# Patient Record
Sex: FEMALE
[De-identification: names, ages, dates, MRNs, and addresses within clinical notes are randomized per-mention and may not be internally consistent; named-entity substitution may affect disease eponyms.]

---

## 2019-03-11 ENCOUNTER — TRANSCRIPTION ENCOUNTER (OUTPATIENT)
Age: 39
End: 2019-03-11

## 2019-03-11 ENCOUNTER — INPATIENT (INPATIENT)
Facility: HOSPITAL | Age: 39
LOS: 2 days | Discharge: HOME | End: 2019-03-14
Attending: UROLOGY | Admitting: UROLOGY
Payer: MEDICAID

## 2019-03-11 VITALS
OXYGEN SATURATION: 100 % | DIASTOLIC BLOOD PRESSURE: 56 MMHG | SYSTOLIC BLOOD PRESSURE: 94 MMHG | TEMPERATURE: 99 F | RESPIRATION RATE: 18 BRPM | HEART RATE: 96 BPM

## 2019-03-11 DIAGNOSIS — Z90.49 ACQUIRED ABSENCE OF OTHER SPECIFIED PARTS OF DIGESTIVE TRACT: Chronic | ICD-10-CM

## 2019-03-11 DIAGNOSIS — N20.0 CALCULUS OF KIDNEY: ICD-10-CM

## 2019-03-11 LAB
ALBUMIN SERPL ELPH-MCNC: 3.8 G/DL — SIGNIFICANT CHANGE UP (ref 3.3–5)
ALP SERPL-CCNC: 58 U/L — SIGNIFICANT CHANGE UP (ref 40–120)
ALT FLD-CCNC: 11 U/L — SIGNIFICANT CHANGE UP (ref 4–33)
ANION GAP SERPL CALC-SCNC: 13 MMO/L — SIGNIFICANT CHANGE UP (ref 7–14)
APPEARANCE UR: CLEAR — SIGNIFICANT CHANGE UP
AST SERPL-CCNC: 26 U/L — SIGNIFICANT CHANGE UP (ref 4–32)
BACTERIA # UR AUTO: NEGATIVE — SIGNIFICANT CHANGE UP
BASE EXCESS BLDV CALC-SCNC: -2.2 MMOL/L — SIGNIFICANT CHANGE UP
BASE EXCESS BLDV CALC-SCNC: 0.6 MMOL/L — SIGNIFICANT CHANGE UP
BASOPHILS # BLD AUTO: 0.02 K/UL — SIGNIFICANT CHANGE UP (ref 0–0.2)
BASOPHILS NFR BLD AUTO: 0.2 % — SIGNIFICANT CHANGE UP (ref 0–2)
BILIRUB SERPL-MCNC: 0.3 MG/DL — SIGNIFICANT CHANGE UP (ref 0.2–1.2)
BILIRUB UR-MCNC: NEGATIVE — SIGNIFICANT CHANGE UP
BLOOD GAS VENOUS - CREATININE: 0.96 MG/DL — SIGNIFICANT CHANGE UP (ref 0.5–1.3)
BLOOD GAS VENOUS - CREATININE: 1.1 MG/DL — SIGNIFICANT CHANGE UP (ref 0.5–1.3)
BLOOD UR QL VISUAL: SIGNIFICANT CHANGE UP
BUN SERPL-MCNC: 26 MG/DL — HIGH (ref 7–23)
CALCIUM SERPL-MCNC: 8.7 MG/DL — SIGNIFICANT CHANGE UP (ref 8.4–10.5)
CHLORIDE BLDV-SCNC: 109 MMOL/L — HIGH (ref 96–108)
CHLORIDE BLDV-SCNC: 112 MMOL/L — HIGH (ref 96–108)
CHLORIDE SERPL-SCNC: 103 MMOL/L — SIGNIFICANT CHANGE UP (ref 98–107)
CO2 SERPL-SCNC: 21 MMOL/L — LOW (ref 22–31)
COLOR SPEC: YELLOW — SIGNIFICANT CHANGE UP
CREAT SERPL-MCNC: 1.13 MG/DL — SIGNIFICANT CHANGE UP (ref 0.5–1.3)
EOSINOPHIL # BLD AUTO: 0.01 K/UL — SIGNIFICANT CHANGE UP (ref 0–0.5)
EOSINOPHIL NFR BLD AUTO: 0.1 % — SIGNIFICANT CHANGE UP (ref 0–6)
GAS PNL BLDV: 133 MMOL/L — LOW (ref 136–146)
GAS PNL BLDV: 134 MMOL/L — LOW (ref 136–146)
GLUCOSE BLDV-MCNC: 100 — HIGH (ref 70–99)
GLUCOSE BLDV-MCNC: 110 — HIGH (ref 70–99)
GLUCOSE SERPL-MCNC: 111 MG/DL — HIGH (ref 70–99)
GLUCOSE UR-MCNC: NEGATIVE — SIGNIFICANT CHANGE UP
HCO3 BLDV-SCNC: 22 MMOL/L — SIGNIFICANT CHANGE UP (ref 20–27)
HCO3 BLDV-SCNC: 24 MMOL/L — SIGNIFICANT CHANGE UP (ref 20–27)
HCT VFR BLD CALC: 36.9 % — SIGNIFICANT CHANGE UP (ref 34.5–45)
HCT VFR BLDV CALC: 32.4 % — LOW (ref 34.5–45)
HCT VFR BLDV CALC: 37.9 % — SIGNIFICANT CHANGE UP (ref 34.5–45)
HGB BLD-MCNC: 11.6 G/DL — SIGNIFICANT CHANGE UP (ref 11.5–15.5)
HGB BLDV-MCNC: 10.5 G/DL — LOW (ref 11.5–15.5)
HGB BLDV-MCNC: 12.3 G/DL — SIGNIFICANT CHANGE UP (ref 11.5–15.5)
HYALINE CASTS # UR AUTO: SIGNIFICANT CHANGE UP
IMM GRANULOCYTES NFR BLD AUTO: 0.2 % — SIGNIFICANT CHANGE UP (ref 0–1.5)
KETONES UR-MCNC: SIGNIFICANT CHANGE UP
LACTATE BLDV-MCNC: 1.5 MMOL/L — SIGNIFICANT CHANGE UP (ref 0.5–2)
LACTATE BLDV-MCNC: 2.5 MMOL/L — HIGH (ref 0.5–2)
LEUKOCYTE ESTERASE UR-ACNC: SIGNIFICANT CHANGE UP
LIDOCAIN IGE QN: 28.1 U/L — SIGNIFICANT CHANGE UP (ref 7–60)
LYMPHOCYTES # BLD AUTO: 0.6 K/UL — LOW (ref 1–3.3)
LYMPHOCYTES # BLD AUTO: 5.6 % — LOW (ref 13–44)
MCHC RBC-ENTMCNC: 28.1 PG — SIGNIFICANT CHANGE UP (ref 27–34)
MCHC RBC-ENTMCNC: 31.4 % — LOW (ref 32–36)
MCV RBC AUTO: 89.3 FL — SIGNIFICANT CHANGE UP (ref 80–100)
MONOCYTES # BLD AUTO: 0.09 K/UL — SIGNIFICANT CHANGE UP (ref 0–0.9)
MONOCYTES NFR BLD AUTO: 0.8 % — LOW (ref 2–14)
NEUTROPHILS # BLD AUTO: 9.91 K/UL — HIGH (ref 1.8–7.4)
NEUTROPHILS NFR BLD AUTO: 93.1 % — HIGH (ref 43–77)
NITRITE UR-MCNC: NEGATIVE — SIGNIFICANT CHANGE UP
NRBC # FLD: 0 K/UL — LOW (ref 25–125)
PCO2 BLDV: 41 MMHG — SIGNIFICANT CHANGE UP (ref 41–51)
PCO2 BLDV: 46 MMHG — SIGNIFICANT CHANGE UP (ref 41–51)
PH BLDV: 7.36 PH — SIGNIFICANT CHANGE UP (ref 7.32–7.43)
PH BLDV: 7.36 PH — SIGNIFICANT CHANGE UP (ref 7.32–7.43)
PH UR: 6.5 — SIGNIFICANT CHANGE UP (ref 5–8)
PLATELET # BLD AUTO: 201 K/UL — SIGNIFICANT CHANGE UP (ref 150–400)
PMV BLD: 9.8 FL — SIGNIFICANT CHANGE UP (ref 7–13)
PO2 BLDV: 30 MMHG — LOW (ref 35–40)
PO2 BLDV: 41 MMHG — HIGH (ref 35–40)
POTASSIUM BLDV-SCNC: 3.1 MMOL/L — LOW (ref 3.4–4.5)
POTASSIUM BLDV-SCNC: 5.3 MMOL/L — HIGH (ref 3.4–4.5)
POTASSIUM SERPL-MCNC: 4 MMOL/L — SIGNIFICANT CHANGE UP (ref 3.5–5.3)
POTASSIUM SERPL-SCNC: 4 MMOL/L — SIGNIFICANT CHANGE UP (ref 3.5–5.3)
PROT SERPL-MCNC: 6.8 G/DL — SIGNIFICANT CHANGE UP (ref 6–8.3)
PROT UR-MCNC: 50 — SIGNIFICANT CHANGE UP
RBC # BLD: 4.13 M/UL — SIGNIFICANT CHANGE UP (ref 3.8–5.2)
RBC # FLD: 14.2 % — SIGNIFICANT CHANGE UP (ref 10.3–14.5)
RBC CASTS # UR COMP ASSIST: SIGNIFICANT CHANGE UP (ref 0–?)
SAO2 % BLDV: 50.8 % — LOW (ref 60–85)
SAO2 % BLDV: 71.2 % — SIGNIFICANT CHANGE UP (ref 60–85)
SODIUM SERPL-SCNC: 137 MMOL/L — SIGNIFICANT CHANGE UP (ref 135–145)
SP GR SPEC: 1.04 — SIGNIFICANT CHANGE UP (ref 1–1.04)
SQUAMOUS # UR AUTO: SIGNIFICANT CHANGE UP
UROBILINOGEN FLD QL: NORMAL — SIGNIFICANT CHANGE UP
WBC # BLD: 10.65 K/UL — HIGH (ref 3.8–10.5)
WBC # FLD AUTO: 10.65 K/UL — HIGH (ref 3.8–10.5)
WBC UR QL: >50 — HIGH (ref 0–?)

## 2019-03-11 PROCEDURE — 74176 CT ABD & PELVIS W/O CONTRAST: CPT | Mod: 26

## 2019-03-11 RX ORDER — INFLUENZA VIRUS VACCINE 15; 15; 15; 15 UG/.5ML; UG/.5ML; UG/.5ML; UG/.5ML
0.5 SUSPENSION INTRAMUSCULAR ONCE
Qty: 0 | Refills: 0 | Status: COMPLETED | OUTPATIENT
Start: 2019-03-11 | End: 2019-03-11

## 2019-03-11 RX ORDER — HEPARIN SODIUM 5000 [USP'U]/ML
5000 INJECTION INTRAVENOUS; SUBCUTANEOUS EVERY 8 HOURS
Qty: 0 | Refills: 0 | Status: DISCONTINUED | OUTPATIENT
Start: 2019-03-11 | End: 2019-03-14

## 2019-03-11 RX ORDER — MORPHINE SULFATE 50 MG/1
4 CAPSULE, EXTENDED RELEASE ORAL ONCE
Qty: 0 | Refills: 0 | Status: DISCONTINUED | OUTPATIENT
Start: 2019-03-11 | End: 2019-03-11

## 2019-03-11 RX ORDER — SODIUM CHLORIDE 9 MG/ML
1000 INJECTION, SOLUTION INTRAVENOUS
Qty: 0 | Refills: 0 | Status: DISCONTINUED | OUTPATIENT
Start: 2019-03-11 | End: 2019-03-13

## 2019-03-11 RX ORDER — ONDANSETRON 8 MG/1
4 TABLET, FILM COATED ORAL ONCE
Qty: 0 | Refills: 0 | Status: COMPLETED | OUTPATIENT
Start: 2019-03-11 | End: 2019-03-11

## 2019-03-11 RX ORDER — KETOROLAC TROMETHAMINE 30 MG/ML
30 SYRINGE (ML) INJECTION ONCE
Qty: 0 | Refills: 0 | Status: DISCONTINUED | OUTPATIENT
Start: 2019-03-11 | End: 2019-03-11

## 2019-03-11 RX ORDER — SODIUM CHLORIDE 9 MG/ML
3 INJECTION INTRAMUSCULAR; INTRAVENOUS; SUBCUTANEOUS ONCE
Qty: 0 | Refills: 0 | Status: COMPLETED | OUTPATIENT
Start: 2019-03-11 | End: 2019-03-11

## 2019-03-11 RX ORDER — CEFTRIAXONE 500 MG/1
1 INJECTION, POWDER, FOR SOLUTION INTRAMUSCULAR; INTRAVENOUS ONCE
Qty: 0 | Refills: 0 | Status: COMPLETED | OUTPATIENT
Start: 2019-03-11 | End: 2019-03-11

## 2019-03-11 RX ORDER — KETOROLAC TROMETHAMINE 30 MG/ML
15 SYRINGE (ML) INJECTION EVERY 6 HOURS
Qty: 0 | Refills: 0 | Status: DISCONTINUED | OUTPATIENT
Start: 2019-03-11 | End: 2019-03-12

## 2019-03-11 RX ORDER — SODIUM CHLORIDE 9 MG/ML
2000 INJECTION INTRAMUSCULAR; INTRAVENOUS; SUBCUTANEOUS ONCE
Qty: 0 | Refills: 0 | Status: COMPLETED | OUTPATIENT
Start: 2019-03-11 | End: 2019-03-11

## 2019-03-11 RX ORDER — MORPHINE SULFATE 50 MG/1
2 CAPSULE, EXTENDED RELEASE ORAL ONCE
Qty: 0 | Refills: 0 | Status: DISCONTINUED | OUTPATIENT
Start: 2019-03-11 | End: 2019-03-11

## 2019-03-11 RX ORDER — ACETAMINOPHEN 500 MG
1000 TABLET ORAL ONCE
Qty: 0 | Refills: 0 | Status: COMPLETED | OUTPATIENT
Start: 2019-03-11 | End: 2019-03-11

## 2019-03-11 RX ORDER — TAMSULOSIN HYDROCHLORIDE 0.4 MG/1
0.4 CAPSULE ORAL AT BEDTIME
Qty: 0 | Refills: 0 | Status: DISCONTINUED | OUTPATIENT
Start: 2019-03-11 | End: 2019-03-14

## 2019-03-11 RX ORDER — ONDANSETRON 8 MG/1
4 TABLET, FILM COATED ORAL EVERY 6 HOURS
Qty: 0 | Refills: 0 | Status: DISCONTINUED | OUTPATIENT
Start: 2019-03-11 | End: 2019-03-14

## 2019-03-11 RX ADMIN — Medication 15 MILLIGRAM(S): at 19:22

## 2019-03-11 RX ADMIN — CEFTRIAXONE 100 GRAM(S): 500 INJECTION, POWDER, FOR SOLUTION INTRAMUSCULAR; INTRAVENOUS at 07:29

## 2019-03-11 RX ADMIN — MORPHINE SULFATE 4 MILLIGRAM(S): 50 CAPSULE, EXTENDED RELEASE ORAL at 07:29

## 2019-03-11 RX ADMIN — MORPHINE SULFATE 2 MILLIGRAM(S): 50 CAPSULE, EXTENDED RELEASE ORAL at 13:10

## 2019-03-11 RX ADMIN — HEPARIN SODIUM 5000 UNIT(S): 5000 INJECTION INTRAVENOUS; SUBCUTANEOUS at 22:19

## 2019-03-11 RX ADMIN — SODIUM CHLORIDE 125 MILLILITER(S): 9 INJECTION, SOLUTION INTRAVENOUS at 12:31

## 2019-03-11 RX ADMIN — MORPHINE SULFATE 4 MILLIGRAM(S): 50 CAPSULE, EXTENDED RELEASE ORAL at 05:40

## 2019-03-11 RX ADMIN — Medication 15 MILLIGRAM(S): at 19:07

## 2019-03-11 RX ADMIN — SODIUM CHLORIDE 2000 MILLILITER(S): 9 INJECTION INTRAMUSCULAR; INTRAVENOUS; SUBCUTANEOUS at 05:20

## 2019-03-11 RX ADMIN — MORPHINE SULFATE 2 MILLIGRAM(S): 50 CAPSULE, EXTENDED RELEASE ORAL at 12:30

## 2019-03-11 RX ADMIN — MORPHINE SULFATE 4 MILLIGRAM(S): 50 CAPSULE, EXTENDED RELEASE ORAL at 05:19

## 2019-03-11 RX ADMIN — TAMSULOSIN HYDROCHLORIDE 0.4 MILLIGRAM(S): 0.4 CAPSULE ORAL at 22:19

## 2019-03-11 RX ADMIN — Medication 400 MILLIGRAM(S): at 13:29

## 2019-03-11 RX ADMIN — Medication 30 MILLIGRAM(S): at 05:40

## 2019-03-11 RX ADMIN — ONDANSETRON 4 MILLIGRAM(S): 8 TABLET, FILM COATED ORAL at 05:19

## 2019-03-11 RX ADMIN — SODIUM CHLORIDE 3 MILLILITER(S): 9 INJECTION INTRAMUSCULAR; INTRAVENOUS; SUBCUTANEOUS at 05:20

## 2019-03-11 RX ADMIN — Medication 30 MILLIGRAM(S): at 06:05

## 2019-03-11 NOTE — H&P ADULT - NSHPLABSRESULTS_GEN_ALL_CORE
11.6   10.65 )-----------( 201      ( 11 Mar 2019 05:10 )             36.9   11 Mar 2019 05:10    137    |  103    |  26     ----------------------------<  111    4.0     |  21     |  1.13     Ca    8.7        11 Mar 2019 05:10    Urinalysis Basic - ( 11 Mar 2019 05:10 )    Color: YELLOW / Appearance: CLEAR / S.039 / pH: 6.5  Gluc: NEGATIVE / Ketone: TRACE  / Bili: NEGATIVE / Urobili: NORMAL   Blood: SMALL / Protein: 50 / Nitrite: NEGATIVE   Leuk Esterase: LARGE / RBC: 3-5 / WBC >50   Sq Epi: OCC / Non Sq Epi: x / Bacteria: NEGATIVE      Culture: Pending      EXAM:  CT ABDOMEN AND PELVIS        PROCEDURE DATE:  Mar 11 2019         INTERPRETATION:  CLINICAL INFORMATION: Right flank/abdominal pain.    COMPARISON: None.    PROCEDURE:   CT of the Abdomen and Pelvis was performed without intravenous contrast  in the prone position.  Intravenous contrast: None.  Oral contrast: None.  Sagittal and coronal reformats were performed.    FINDINGS:    LOWER CHEST: Bilateral breast implants.    LIVER: Within normal limits.  BILE DUCTS: Normal caliber.   GALLBLADDER: Mild pericholecystic fluid, nonspecific.  SPLEEN: Within normal limits.  PANCREAS: Within normal limits.  ADRENALS: Within normal limits.  KIDNEYS/URETERS: Obstructing calculus in the right ureteropelvic   junction, measuring 1.1 x 0.8 cm with moderate right   hydroureteronephrosis and perinephric stranding.     BLADDER: Within normal limits.  REPRODUCTIVE ORGANS: The uterus and adnexa are within normal limits.    BOWEL: No bowel obstruction. Normal appendix.    PERITONEUM: No ascites.  VESSELS:  Within normal limits.  RETROPERITONEUM: No lymphadenopathy.    ABDOMINAL WALL: Within normal limits.  BONES: Within normal limits.    IMPRESSION: Obstructing calculus in the right ureteropelvic junction,   measuring 1.1 x 0.8 cm with moderate right hydroureteronephrosis and   perinephric stranding.                   JR YU M.D., ATTENDING RADIOLOGIST  This document has been electronically signed. Mar 11 2019 10:19AM                  < end of copied text >

## 2019-03-11 NOTE — H&P ADULT - HISTORY OF PRESENT ILLNESS
This is a 39 y/o F presenting with N/V, malaise, abdominal pain since 3 AM today.  No fevers, + chills.  No dysuria, hematuria or pyuria.    She has no family history of nephrolithiasis and takes no medications.  She has never had these symptoms before.

## 2019-03-11 NOTE — ED ADULT NURSE REASSESSMENT NOTE - NS ED NURSE REASSESS COMMENT FT1
pt still c/o right flank pain 6/10 with activity, 5/10 at rest. pt was given morhpine. will administer IV Tylenol

## 2019-03-11 NOTE — ED PROVIDER NOTE - ATTENDING CONTRIBUTION TO CARE
38 year old female with a history of renal stone came ot the ED because of right flank pain with vomiting that began 2 hours after eating [employer], the pain radiates to the groin. She is currently menstruating. No fever, no chills, no urinary complaints, no chest pain, no sob.

## 2019-03-11 NOTE — ED PROVIDER NOTE - NS ED ROS FT
ROS: denies HA, weakness, dizziness, fevers/chills, chest pain, SOB, diaphoresis, diarrhea, joint pain, neuro deficits, dysuria/hematuria, rash    +n/v, syncope, R abd pain

## 2019-03-11 NOTE — ED ADULT NURSE REASSESSMENT NOTE - NS ED NURSE REASSESS COMMENT FT1
received pt from MARIA ALEJANDRA Gannon. Pt a&ox3. pt c/o right flank pain that radiates toward right abdomen. pt reports improvement in pain (3/10). pt denies difficulty urinating and that her bowel movements have been normal. pt currently on menstrual cycle. pt reports having extremely spicy food yesterday , the pain started after this. abdomen soft and tender to touch on the right side. BS present throughout.

## 2019-03-11 NOTE — H&P ADULT - NSHPPHYSICALEXAM_GEN_ALL_CORE
ICU Vital Signs Last 24 Hrs  T(C): 37.2 (11 Mar 2019 11:31), Max: 37.2 (11 Mar 2019 04:38)  T(F): 99 (11 Mar 2019 11:31), Max: 99 (11 Mar 2019 04:38)  HR: 94 (11 Mar 2019 11:31) (76 - 96)  BP: 94/57 (11 Mar 2019 11:31) (91/53 - 106/68)  BP(mean): 61 (11 Mar 2019 07:10) (61 - 61)  ABP: --  ABP(mean): --  RR: 16 (11 Mar 2019 11:31) (16 - 20)  SpO2: 98% (11 Mar 2019 11:31) (98% - 100%)  Voids - not recorded    Gen: mild distress, appearas diaphoretic  CV: RRR  Resp: Normal work of breathing  Abd: Soft, + percussive tenderness to palpation of right flank  : bladder non-palpable

## 2019-03-11 NOTE — ED ADULT NURSE NOTE - NSIMPLEMENTINTERV_GEN_ALL_ED
Implemented All Universal Safety Interventions:  [place] to call system. Call bell, personal items and telephone within reach. Instruct patient to call for assistance. Room bathroom lighting operational. Non-slip footwear when patient is off stretcher. Physically safe environment: no spills, clutter or unnecessary equipment. Stretcher in lowest position, wheels locked, appropriate side rails in place.

## 2019-03-11 NOTE — ED ADULT NURSE NOTE - CHIEF COMPLAINT QUOTE
Pt Georgian speaking.  Family preferred to translate.  Recent travel from [place].  Pt p/w R flank pain, abd pain, N/V, since ~1030.  denies urinary symptoms.  currently on menstrual cycle.  pmhx kidney stones.  took 4 Advil 200mg prior to arrival.  VS as noted

## 2019-03-11 NOTE — ED PROVIDER NOTE - OBJECTIVE STATEMENT
37yo F with hx of appendectomy presents with R sided abd pain started 1030pm associated with 5 episodes of NBNB vomiting. No diarrhea. Sexually active. VB today. no burning with urination. Had multiple kidney stones in the past, none in the past year. No surg required for kidney stones. presyncopal/syncope due to pain

## 2019-03-11 NOTE — ED ADULT TRIAGE NOTE - CHIEF COMPLAINT QUOTE
Pt Upper sorbian speaking.  Family preferred to translate.  Recent travel from [place].  Pt p/w R flank pain, abd pain, N/V, since ~1030.  denies urinary symptoms.  currently on menstrual cycle.  pmhx kidney stones.  took 4 Advil 200mg prior to arrival.  VS as noted

## 2019-03-11 NOTE — H&P ADULT - NSHPREVIEWOFSYSTEMS_GEN_ALL_CORE
Gen: + malaise  CV: no palpitations or chest pain  Resp: no shortness of breath  GI: No N/V, No difficulty passing BMs  : no difficulty passing urine, normal urinary stream  MSK: no edema or joint pain/swelling  integumentary: no rash

## 2019-03-11 NOTE — ED PROVIDER NOTE - PHYSICAL EXAMINATION
Gen: In moderate distress, vomiting  Head: NCAT  HEENT: PERRL, MMM, normal conjunctiva, anicteric, neck supple  Lung: CTAB, no adventitious sounds  CV: RRR, no murmurs, rubs or gallops  Abd: soft, RLQ TTP, no CVAT  MSK: No edema, no visible deformities  Neuro: No focal neurologic deficits. CN II-XII grossly intact. 5/5 strength and normal sensation in all extremities.  Skin: Warm and dry, no evidence of rash  Psych: normal mood and affect

## 2019-03-11 NOTE — ED PROVIDER NOTE - CLINICAL SUMMARY MEDICAL DECISION MAKING FREE TEXT BOX
Pt with flank pain, history of appendectomy, vomiting and flank pain, having her period. WIll check labs, pain control and ct scan.

## 2019-03-11 NOTE — ED ADULT NURSE NOTE - OBJECTIVE STATEMENT
pt on bed aox3 [place] speaking, sister at bedside, reports Right Flank pain radiates to RLQ abdomen since 3pm with nausea Vomiting , Also reports eating chicken form restaurant, dysuria Headache dizziness Lightheadedness, almost passed out in the bathroom,due to pain,  denies C palpitation PMhx Kidney stone 3 yrs ago MD atbedside eval the pt. will monitor

## 2019-03-11 NOTE — ED ADULT NURSE REASSESSMENT NOTE - NS ED NURSE REASSESS COMMENT FT1
have called floor 3 times to give report . I was given to the wrong nurse twice and the 3 rd time the nurse was not available. faxing report now

## 2019-03-12 DIAGNOSIS — Z09 ENCOUNTER FOR FOLLOW-UP EXAMINATION AFTER COMPLETED TREATMENT FOR CONDITIONS OTHER THAN MALIGNANT NEOPLASM: ICD-10-CM

## 2019-03-12 LAB
ANION GAP SERPL CALC-SCNC: 13 MMO/L — SIGNIFICANT CHANGE UP (ref 7–14)
BUN SERPL-MCNC: 18 MG/DL — SIGNIFICANT CHANGE UP (ref 7–23)
CALCIUM SERPL-MCNC: 7.5 MG/DL — LOW (ref 8.4–10.5)
CHLORIDE SERPL-SCNC: 103 MMOL/L — SIGNIFICANT CHANGE UP (ref 98–107)
CO2 SERPL-SCNC: 19 MMOL/L — LOW (ref 22–31)
CREAT SERPL-MCNC: 1.16 MG/DL — SIGNIFICANT CHANGE UP (ref 0.5–1.3)
GLUCOSE SERPL-MCNC: 98 MG/DL — SIGNIFICANT CHANGE UP (ref 70–99)
HCT VFR BLD CALC: 29.8 % — LOW (ref 34.5–45)
HGB BLD-MCNC: 9.6 G/DL — LOW (ref 11.5–15.5)
MCHC RBC-ENTMCNC: 29 PG — SIGNIFICANT CHANGE UP (ref 27–34)
MCHC RBC-ENTMCNC: 32.2 % — SIGNIFICANT CHANGE UP (ref 32–36)
MCV RBC AUTO: 90 FL — SIGNIFICANT CHANGE UP (ref 80–100)
NRBC # FLD: 0 K/UL — LOW (ref 25–125)
PLATELET # BLD AUTO: 124 K/UL — LOW (ref 150–400)
PMV BLD: 10.4 FL — SIGNIFICANT CHANGE UP (ref 7–13)
POTASSIUM SERPL-MCNC: 3.6 MMOL/L — SIGNIFICANT CHANGE UP (ref 3.5–5.3)
POTASSIUM SERPL-SCNC: 3.6 MMOL/L — SIGNIFICANT CHANGE UP (ref 3.5–5.3)
RBC # BLD: 3.31 M/UL — LOW (ref 3.8–5.2)
RBC # FLD: 15.1 % — HIGH (ref 10.3–14.5)
SODIUM SERPL-SCNC: 135 MMOL/L — SIGNIFICANT CHANGE UP (ref 135–145)
WBC # BLD: 23.32 K/UL — HIGH (ref 3.8–10.5)
WBC # FLD AUTO: 23.32 K/UL — HIGH (ref 3.8–10.5)

## 2019-03-12 PROCEDURE — 99231 SBSQ HOSP IP/OBS SF/LOW 25: CPT | Mod: 24

## 2019-03-12 PROCEDURE — 52332 CYSTOSCOPY AND TREATMENT: CPT | Mod: RT

## 2019-03-12 RX ORDER — BENZOCAINE AND MENTHOL 5; 1 G/100ML; G/100ML
1 LIQUID ORAL
Qty: 0 | Refills: 0 | Status: DISCONTINUED | OUTPATIENT
Start: 2019-03-12 | End: 2019-03-14

## 2019-03-12 RX ORDER — SODIUM CHLORIDE 9 MG/ML
1000 INJECTION, SOLUTION INTRAVENOUS ONCE
Qty: 0 | Refills: 0 | Status: COMPLETED | OUTPATIENT
Start: 2019-03-12 | End: 2019-03-12

## 2019-03-12 RX ORDER — ACETAMINOPHEN 500 MG
1000 TABLET ORAL ONCE
Qty: 0 | Refills: 0 | Status: COMPLETED | OUTPATIENT
Start: 2019-03-12 | End: 2019-03-12

## 2019-03-12 RX ORDER — FENTANYL CITRATE 50 UG/ML
50 INJECTION INTRAVENOUS ONCE
Qty: 0 | Refills: 0 | Status: DISCONTINUED | OUTPATIENT
Start: 2019-03-12 | End: 2019-03-12

## 2019-03-12 RX ORDER — ALBUMIN HUMAN 25 %
250 VIAL (ML) INTRAVENOUS ONCE
Qty: 0 | Refills: 0 | Status: COMPLETED | OUTPATIENT
Start: 2019-03-12 | End: 2019-03-12

## 2019-03-12 RX ORDER — TRAMADOL HYDROCHLORIDE 50 MG/1
25 TABLET ORAL EVERY 4 HOURS
Qty: 0 | Refills: 0 | Status: DISCONTINUED | OUTPATIENT
Start: 2019-03-12 | End: 2019-03-14

## 2019-03-12 RX ORDER — SODIUM CHLORIDE 9 MG/ML
1000 INJECTION INTRAMUSCULAR; INTRAVENOUS; SUBCUTANEOUS ONCE
Qty: 0 | Refills: 0 | Status: COMPLETED | OUTPATIENT
Start: 2019-03-12 | End: 2019-03-12

## 2019-03-12 RX ORDER — CEFTRIAXONE 500 MG/1
1 INJECTION, POWDER, FOR SOLUTION INTRAMUSCULAR; INTRAVENOUS EVERY 24 HOURS
Qty: 0 | Refills: 0 | Status: DISCONTINUED | OUTPATIENT
Start: 2019-03-12 | End: 2019-03-12

## 2019-03-12 RX ORDER — SODIUM CHLORIDE 9 MG/ML
1000 INJECTION, SOLUTION INTRAVENOUS
Qty: 0 | Refills: 0 | Status: COMPLETED | OUTPATIENT
Start: 2019-03-12 | End: 2019-03-12

## 2019-03-12 RX ORDER — PIPERACILLIN AND TAZOBACTAM 4; .5 G/20ML; G/20ML
3.38 INJECTION, POWDER, LYOPHILIZED, FOR SOLUTION INTRAVENOUS EVERY 8 HOURS
Qty: 0 | Refills: 0 | Status: DISCONTINUED | OUTPATIENT
Start: 2019-03-12 | End: 2019-03-14

## 2019-03-12 RX ORDER — ONDANSETRON 8 MG/1
4 TABLET, FILM COATED ORAL ONCE
Qty: 0 | Refills: 0 | Status: DISCONTINUED | OUTPATIENT
Start: 2019-03-12 | End: 2019-03-12

## 2019-03-12 RX ORDER — TRAMADOL HYDROCHLORIDE 50 MG/1
50 TABLET ORAL EVERY 6 HOURS
Qty: 0 | Refills: 0 | Status: DISCONTINUED | OUTPATIENT
Start: 2019-03-12 | End: 2019-03-14

## 2019-03-12 RX ORDER — FENTANYL CITRATE 50 UG/ML
25 INJECTION INTRAVENOUS
Qty: 0 | Refills: 0 | Status: DISCONTINUED | OUTPATIENT
Start: 2019-03-12 | End: 2019-03-12

## 2019-03-12 RX ORDER — DOCUSATE SODIUM 100 MG
100 CAPSULE ORAL
Qty: 0 | Refills: 0 | Status: DISCONTINUED | OUTPATIENT
Start: 2019-03-12 | End: 2019-03-13

## 2019-03-12 RX ADMIN — TAMSULOSIN HYDROCHLORIDE 0.4 MILLIGRAM(S): 0.4 CAPSULE ORAL at 22:22

## 2019-03-12 RX ADMIN — PIPERACILLIN AND TAZOBACTAM 25 GRAM(S): 4; .5 INJECTION, POWDER, LYOPHILIZED, FOR SOLUTION INTRAVENOUS at 13:15

## 2019-03-12 RX ADMIN — HEPARIN SODIUM 5000 UNIT(S): 5000 INJECTION INTRAVENOUS; SUBCUTANEOUS at 13:16

## 2019-03-12 RX ADMIN — Medication 15 MILLIGRAM(S): at 06:55

## 2019-03-12 RX ADMIN — Medication 500 MILLILITER(S): at 08:58

## 2019-03-12 RX ADMIN — Medication 15 MILLIGRAM(S): at 01:03

## 2019-03-12 RX ADMIN — SODIUM CHLORIDE 1000 MILLILITER(S): 9 INJECTION, SOLUTION INTRAVENOUS at 06:31

## 2019-03-12 RX ADMIN — SODIUM CHLORIDE 125 MILLILITER(S): 9 INJECTION, SOLUTION INTRAVENOUS at 18:45

## 2019-03-12 RX ADMIN — Medication 400 MILLIGRAM(S): at 01:57

## 2019-03-12 RX ADMIN — PIPERACILLIN AND TAZOBACTAM 25 GRAM(S): 4; .5 INJECTION, POWDER, LYOPHILIZED, FOR SOLUTION INTRAVENOUS at 06:19

## 2019-03-12 RX ADMIN — Medication 100 MILLIGRAM(S): at 23:16

## 2019-03-12 RX ADMIN — SODIUM CHLORIDE 1000 MILLILITER(S): 9 INJECTION, SOLUTION INTRAVENOUS at 05:34

## 2019-03-12 RX ADMIN — HEPARIN SODIUM 5000 UNIT(S): 5000 INJECTION INTRAVENOUS; SUBCUTANEOUS at 22:23

## 2019-03-12 RX ADMIN — PIPERACILLIN AND TAZOBACTAM 25 GRAM(S): 4; .5 INJECTION, POWDER, LYOPHILIZED, FOR SOLUTION INTRAVENOUS at 22:22

## 2019-03-12 RX ADMIN — Medication 500 MILLILITER(S): at 09:29

## 2019-03-12 RX ADMIN — SODIUM CHLORIDE 2000 MILLILITER(S): 9 INJECTION INTRAMUSCULAR; INTRAVENOUS; SUBCUTANEOUS at 01:10

## 2019-03-12 RX ADMIN — TRAMADOL HYDROCHLORIDE 25 MILLIGRAM(S): 50 TABLET ORAL at 23:16

## 2019-03-12 RX ADMIN — BENZOCAINE AND MENTHOL 1 LOZENGE: 5; 1 LIQUID ORAL at 22:22

## 2019-03-12 RX ADMIN — SODIUM CHLORIDE 1000 MILLILITER(S): 9 INJECTION, SOLUTION INTRAVENOUS at 04:15

## 2019-03-12 RX ADMIN — SODIUM CHLORIDE 125 MILLILITER(S): 9 INJECTION, SOLUTION INTRAVENOUS at 13:16

## 2019-03-12 RX ADMIN — HEPARIN SODIUM 5000 UNIT(S): 5000 INJECTION INTRAVENOUS; SUBCUTANEOUS at 06:22

## 2019-03-12 RX ADMIN — Medication 15 MILLIGRAM(S): at 23:16

## 2019-03-12 RX ADMIN — Medication 15 MILLIGRAM(S): at 01:05

## 2019-03-12 RX ADMIN — SODIUM CHLORIDE 125 MILLILITER(S): 9 INJECTION, SOLUTION INTRAVENOUS at 06:00

## 2019-03-12 RX ADMIN — Medication 15 MILLIGRAM(S): at 06:40

## 2019-03-12 RX ADMIN — Medication 15 MILLIGRAM(S): at 18:42

## 2019-03-12 RX ADMIN — Medication 15 MILLIGRAM(S): at 13:16

## 2019-03-12 NOTE — PROGRESS NOTE ADULT - SUBJECTIVE AND OBJECTIVE BOX
Subjective    Patient seen & examined on AM rounds  Febrile overnight, taken emergently for left ureteral stenting  Feeling better now.  Pain is controlled.  BP low, s/p 2L bolus with minimal response.    Objective    Vital signs  T(F): , Max: 102.8 (03-12-19 @ 00:59)  HR: 62 (03-12-19 @ 09:30)  BP: 96/66 (03-12-19 @ 09:30)  SpO2: 96% (03-12-19 @ 09:30)  Wt(kg): --    Output     03-11 @ 07:01  -  03-12 @ 07:00  --------------------------------------------------------  IN: 5318 mL / OUT: 1050 mL / NET: 4268 mL    03-12 @ 07:01  -  03-12 @ 09:56  --------------------------------------------------------  IN: 750 mL / OUT: 575 mL / NET: 175 mL    Gen NAD  Abd soft, NT, ND   lee draining clear yellow urine    Labs  03-12 @ 05:50  WBC 23.32 / Hct 29.8  / SCr 1.16     03-11 @ 05:10    WBC 10.65 / Hct 36.9  / SCr 1.13     Urine Cx: p  Blood Cx: p    Imaging    < from: CT Abdomen and Pelvis No Cont (03.11.19 @ 09:31) >  FINDINGS:    LOWER CHEST: Bilateral breast implants.    LIVER: Within normal limits.  BILE DUCTS: Normal caliber.   GALLBLADDER: Mild pericholecystic fluid, nonspecific.  SPLEEN: Within normal limits.  PANCREAS: Within normal limits.  ADRENALS: Within normal limits.  KIDNEYS/URETERS: Obstructing calculus in the right ureteropelvic   junction, measuring 1.1 x 0.8 cm with moderate right   hydroureteronephrosis and perinephric stranding.     BLADDER: Within normal limits.  REPRODUCTIVE ORGANS: The uterus and adnexa are within normal limits.    BOWEL: No bowel obstruction. Normal appendix.    PERITONEUM: No ascites.  VESSELS:  Within normal limits.  RETROPERITONEUM: No lymphadenopathy.    ABDOMINAL WALL: Within normal limits.  BONES: Within normal limits.    IMPRESSION: Obstructing calculus in the right ureteropelvic junction,   measuring 1.1 x 0.8 cm with moderate right hydroureteronephrosis and   perinephric stranding.       < end of copied text >

## 2019-03-12 NOTE — PROGRESS NOTE ADULT - ASSESSMENT
38 year old female presenting with new onset L renal colic secondary to a 1cm obstructing right proximal ureteral calculus.  Febrile overnight 3/12 -> taken emergently for left ureteral stent.  Recovering uneventfully now    -- AM CBC reviewed, reactive leukocytosis likely from sepsis  -- f/u urine and blood cultures  -- continue lee  -- regular diet  -- supportive care  -- zosyn

## 2019-03-12 NOTE — PROGRESS NOTE ADULT - SUBJECTIVE AND OBJECTIVE BOX
Subjective  Patient denies abdominal pain, nausea, vomiting.    Objective    Vital signs  T(F): , Max: 102.8 (03-12-19 @ 00:59)  HR: 77 (03-12-19 @ 05:45)  BP: 88/59 (03-12-19 @ 05:45)  SpO2: 99% (03-12-19 @ 05:45)  Wt(kg): --    Output     03-11 @ 07:01  -  03-12 @ 05:57  --------------------------------------------------------  IN: 2693 mL / OUT: 475 mL / NET: 2218 mL        Gen: No distress observed  Abd: soft, non-tender  : + lee, clear urine    Labs      03-11 @ 05:10    WBC 10.65 / Hct 36.9  / SCr 1.13       Urine Cx: ?  Blood Cx: ?    Imaging

## 2019-03-13 ENCOUNTER — TRANSCRIPTION ENCOUNTER (OUTPATIENT)
Age: 39
End: 2019-03-13

## 2019-03-13 LAB
BACTERIA UR CULT: SIGNIFICANT CHANGE UP
CULTURE - ACID FAST SMEAR CONCENTRATED: SIGNIFICANT CHANGE UP
HCT VFR BLD CALC: 30 % — LOW (ref 34.5–45)
HGB BLD-MCNC: 9.6 G/DL — LOW (ref 11.5–15.5)
MCHC RBC-ENTMCNC: 28.2 PG — SIGNIFICANT CHANGE UP (ref 27–34)
MCHC RBC-ENTMCNC: 32 % — SIGNIFICANT CHANGE UP (ref 32–36)
MCV RBC AUTO: 88.2 FL — SIGNIFICANT CHANGE UP (ref 80–100)
NRBC # FLD: 0 K/UL — LOW (ref 25–125)
PLATELET # BLD AUTO: 147 K/UL — LOW (ref 150–400)
PMV BLD: 11.1 FL — SIGNIFICANT CHANGE UP (ref 7–13)
RBC # BLD: 3.4 M/UL — LOW (ref 3.8–5.2)
RBC # FLD: 15.4 % — HIGH (ref 10.3–14.5)
SPECIMEN SOURCE: SIGNIFICANT CHANGE UP
WBC # BLD: 21.38 K/UL — HIGH (ref 3.8–10.5)
WBC # FLD AUTO: 21.38 K/UL — HIGH (ref 3.8–10.5)

## 2019-03-13 PROCEDURE — 99231 SBSQ HOSP IP/OBS SF/LOW 25: CPT

## 2019-03-13 RX ORDER — SENNA PLUS 8.6 MG/1
2 TABLET ORAL AT BEDTIME
Qty: 0 | Refills: 0 | Status: DISCONTINUED | OUTPATIENT
Start: 2019-03-13 | End: 2019-03-14

## 2019-03-13 RX ORDER — DOCUSATE SODIUM 100 MG
100 CAPSULE ORAL THREE TIMES A DAY
Qty: 0 | Refills: 0 | Status: DISCONTINUED | OUTPATIENT
Start: 2019-03-13 | End: 2019-03-14

## 2019-03-13 RX ORDER — ACETAMINOPHEN 500 MG
1000 TABLET ORAL ONCE
Qty: 0 | Refills: 0 | Status: COMPLETED | OUTPATIENT
Start: 2019-03-13 | End: 2019-03-13

## 2019-03-13 RX ORDER — SODIUM CHLORIDE 9 MG/ML
1000 INJECTION INTRAMUSCULAR; INTRAVENOUS; SUBCUTANEOUS ONCE
Qty: 0 | Refills: 0 | Status: COMPLETED | OUTPATIENT
Start: 2019-03-13 | End: 2019-03-13

## 2019-03-13 RX ORDER — MAGNESIUM HYDROXIDE 400 MG/1
30 TABLET, CHEWABLE ORAL ONCE
Qty: 0 | Refills: 0 | Status: COMPLETED | OUTPATIENT
Start: 2019-03-13 | End: 2019-03-13

## 2019-03-13 RX ORDER — SODIUM CHLORIDE 9 MG/ML
1000 INJECTION, SOLUTION INTRAVENOUS
Qty: 0 | Refills: 0 | Status: DISCONTINUED | OUTPATIENT
Start: 2019-03-13 | End: 2019-03-13

## 2019-03-13 RX ORDER — SIMETHICONE 80 MG/1
80 TABLET, CHEWABLE ORAL DAILY
Qty: 0 | Refills: 0 | Status: DISCONTINUED | OUTPATIENT
Start: 2019-03-13 | End: 2019-03-14

## 2019-03-13 RX ORDER — POLYETHYLENE GLYCOL 3350 17 G/17G
17 POWDER, FOR SOLUTION ORAL ONCE
Qty: 0 | Refills: 0 | Status: COMPLETED | OUTPATIENT
Start: 2019-03-13 | End: 2019-03-13

## 2019-03-13 RX ADMIN — HEPARIN SODIUM 5000 UNIT(S): 5000 INJECTION INTRAVENOUS; SUBCUTANEOUS at 21:41

## 2019-03-13 RX ADMIN — ONDANSETRON 4 MILLIGRAM(S): 8 TABLET, FILM COATED ORAL at 19:54

## 2019-03-13 RX ADMIN — PIPERACILLIN AND TAZOBACTAM 25 GRAM(S): 4; .5 INJECTION, POWDER, LYOPHILIZED, FOR SOLUTION INTRAVENOUS at 13:39

## 2019-03-13 RX ADMIN — TRAMADOL HYDROCHLORIDE 50 MILLIGRAM(S): 50 TABLET ORAL at 10:50

## 2019-03-13 RX ADMIN — TAMSULOSIN HYDROCHLORIDE 0.4 MILLIGRAM(S): 0.4 CAPSULE ORAL at 21:40

## 2019-03-13 RX ADMIN — SODIUM CHLORIDE 75 MILLILITER(S): 9 INJECTION, SOLUTION INTRAVENOUS at 08:55

## 2019-03-13 RX ADMIN — TRAMADOL HYDROCHLORIDE 50 MILLIGRAM(S): 50 TABLET ORAL at 10:20

## 2019-03-13 RX ADMIN — HEPARIN SODIUM 5000 UNIT(S): 5000 INJECTION INTRAVENOUS; SUBCUTANEOUS at 06:45

## 2019-03-13 RX ADMIN — PIPERACILLIN AND TAZOBACTAM 25 GRAM(S): 4; .5 INJECTION, POWDER, LYOPHILIZED, FOR SOLUTION INTRAVENOUS at 21:40

## 2019-03-13 RX ADMIN — SIMETHICONE 80 MILLIGRAM(S): 80 TABLET, CHEWABLE ORAL at 18:16

## 2019-03-13 RX ADMIN — POLYETHYLENE GLYCOL 3350 17 GRAM(S): 17 POWDER, FOR SOLUTION ORAL at 10:20

## 2019-03-13 RX ADMIN — MAGNESIUM HYDROXIDE 30 MILLILITER(S): 400 TABLET, CHEWABLE ORAL at 18:16

## 2019-03-13 RX ADMIN — SODIUM CHLORIDE 1000 MILLILITER(S): 9 INJECTION INTRAMUSCULAR; INTRAVENOUS; SUBCUTANEOUS at 02:36

## 2019-03-13 RX ADMIN — HEPARIN SODIUM 5000 UNIT(S): 5000 INJECTION INTRAVENOUS; SUBCUTANEOUS at 13:29

## 2019-03-13 RX ADMIN — Medication 100 MILLIGRAM(S): at 06:45

## 2019-03-13 RX ADMIN — PIPERACILLIN AND TAZOBACTAM 25 GRAM(S): 4; .5 INJECTION, POWDER, LYOPHILIZED, FOR SOLUTION INTRAVENOUS at 06:45

## 2019-03-13 RX ADMIN — Medication 400 MILLIGRAM(S): at 21:40

## 2019-03-13 RX ADMIN — TRAMADOL HYDROCHLORIDE 25 MILLIGRAM(S): 50 TABLET ORAL at 00:20

## 2019-03-13 NOTE — PROGRESS NOTE ADULT - ASSESSMENT
39 yo F admitted 3/11 with L renal colic secondary to a 1cm obstructing right proximal ureteral calculus.  Febrile overnight 3/12 -> taken emergently for left ureteral stent.  Given IV bolus for low urine output 3/13 with appropriate response, OR cx Ecoli    -- AM CBC reviewed, reactive leukocytosis likely from sepsis  -- f/u urine and blood cultures  -- d/c lee monitor UO  -- IVM to IVL once voids  -- bowel regimen  -- regular diet  -- supportive care  -- continue zosyn   -- will f/u in clinic, OR on 3/26

## 2019-03-13 NOTE — PROVIDER CONTACT NOTE (OTHER) - SITUATION
Patient c/o nausea, c/o chills, rash noted on chest red bumps, PA made aware, zofran given for nausea, temp WNL, extra blanket given, awaiting PA orders for rash, will continue to monitor
Patient has low BP 96/61 HR 68, no acute distress noted, PA made aware, will continue to monitor
BP 83/54

## 2019-03-13 NOTE — DISCHARGE NOTE NURSING/CASE MANAGEMENT/SOCIAL WORK - NSDCPNINST_GEN_ALL_CORE
Call MD for any c/o difficulty urinating, bloody urine, fever and a return appointment.  Take over the counter stool softener to prevent constipation that can be a side effect from taking pain medication. Call MD with any signs of infection ie. fever/shaking chills, cloudy foul-smelling urine, or with signs of bleeding (urine red like ketchup), or persistent nausea, or with increased unrelieved flank pain. Continue to drink plenty of fluids and avoid straining as well as constipation which may be a side effect from taking narcotic pain meds. Follow-up with MD in office for post-op check as well as with PMD as advised for continuity of care.

## 2019-03-13 NOTE — PROGRESS NOTE ADULT - SUBJECTIVE AND OBJECTIVE BOX
ANESTHESIA POSTOP CHECK    38y Female POSTOP DAY 1 S/P     Vital Signs Last 24 Hrs  T(C): 36.7 (13 Mar 2019 09:57), Max: 36.9 (12 Mar 2019 22:18)  T(F): 98 (13 Mar 2019 09:57), Max: 98.4 (12 Mar 2019 22:18)  HR: 58 (13 Mar 2019 09:57) (53 - 58)  BP: 106/68 (13 Mar 2019 09:57) (99/64 - 107/68)  BP(mean): --  RR: 17 (13 Mar 2019 09:57) (16 - 17)  SpO2: 96% (13 Mar 2019 09:57) (93% - 100%)  I&O's Summary    12 Mar 2019 07:01  -  13 Mar 2019 07:00  --------------------------------------------------------  IN: 1175 mL / OUT: 1910 mL / NET: -735 mL        [X ] NO APPARENT ANESTHESIA COMPLICATIONS      Comments:

## 2019-03-13 NOTE — PROGRESS NOTE ADULT - SUBJECTIVE AND OBJECTIVE BOX
Overnight events:  Remained afebrile, received bolus overnight for low urine output with appropriate response    Subjective:  Pt c/o slight abdominal discomfort, has not had BM in few days    Objective:    Vital signs  T(C): , Max: 36.9 (03-12-19 @ 22:18)  HR: 58 (03-13-19 @ 09:57)  BP: 106/68 (03-13-19 @ 09:57)  SpO2: 96% (03-13-19 @ 09:57)  Wt(kg): --    Output   Lee: 485      Gen: NAD  Abd: soft, nontender, minimal right flank discomfort  : lee removed on rounds      Labs                        9.6    21.38 )-----------( 147      ( 13 Mar 2019 05:58 )             30.0           Urine Cx:   Culture - Urine (03.12.19 @ 11:10)    Culture - Urine:   EC^Escherichia coli  COLONY COUNT: MODERATE    Specimen Source: URINE KIDNEY

## 2019-03-14 ENCOUNTER — TRANSCRIPTION ENCOUNTER (OUTPATIENT)
Age: 39
End: 2019-03-14

## 2019-03-14 VITALS
HEART RATE: 76 BPM | SYSTOLIC BLOOD PRESSURE: 100 MMHG | RESPIRATION RATE: 16 BRPM | TEMPERATURE: 98 F | DIASTOLIC BLOOD PRESSURE: 51 MMHG | OXYGEN SATURATION: 98 %

## 2019-03-14 LAB
-  AMIKACIN: SIGNIFICANT CHANGE UP
-  AMPICILLIN/SULBACTAM: SIGNIFICANT CHANGE UP
-  AMPICILLIN: SIGNIFICANT CHANGE UP
-  AZTREONAM: SIGNIFICANT CHANGE UP
-  CEFAZOLIN: SIGNIFICANT CHANGE UP
-  CEFEPIME: SIGNIFICANT CHANGE UP
-  CEFOXITIN: SIGNIFICANT CHANGE UP
-  CEFTAZIDIME: SIGNIFICANT CHANGE UP
-  CEFTRIAXONE: SIGNIFICANT CHANGE UP
-  CIPROFLOXACIN: SIGNIFICANT CHANGE UP
-  ERTAPENEM: SIGNIFICANT CHANGE UP
-  GENTAMICIN: SIGNIFICANT CHANGE UP
-  IMIPENEM: SIGNIFICANT CHANGE UP
-  LEVOFLOXACIN: SIGNIFICANT CHANGE UP
-  MEROPENEM: SIGNIFICANT CHANGE UP
-  NITROFURANTOIN: SIGNIFICANT CHANGE UP
-  PIPERACILLIN/TAZOBACTAM: SIGNIFICANT CHANGE UP
-  TIGECYCLINE: SIGNIFICANT CHANGE UP
-  TOBRAMYCIN: SIGNIFICANT CHANGE UP
-  TRIMETHOPRIM/SULFAMETHOXAZOLE: SIGNIFICANT CHANGE UP
BACTERIA UR CULT: SIGNIFICANT CHANGE UP
METHOD TYPE: SIGNIFICANT CHANGE UP
ORGANISM # SPEC MICROSCOPIC CNT: SIGNIFICANT CHANGE UP
ORGANISM # SPEC MICROSCOPIC CNT: SIGNIFICANT CHANGE UP

## 2019-03-14 PROCEDURE — 71045 X-RAY EXAM CHEST 1 VIEW: CPT | Mod: 26

## 2019-03-14 RX ORDER — IBUPROFEN 200 MG
400 TABLET ORAL EVERY 6 HOURS
Qty: 0 | Refills: 0 | Status: DISCONTINUED | OUTPATIENT
Start: 2019-03-14 | End: 2019-03-14

## 2019-03-14 RX ORDER — TRAMADOL HYDROCHLORIDE 50 MG/1
1 TABLET ORAL
Qty: 8 | Refills: 0 | OUTPATIENT
Start: 2019-03-14

## 2019-03-14 RX ORDER — SENNA PLUS 8.6 MG/1
2 TABLET ORAL
Qty: 0 | Refills: 0 | COMMUNITY

## 2019-03-14 RX ORDER — IBUPROFEN 200 MG
1 TABLET ORAL
Qty: 30 | Refills: 0 | OUTPATIENT
Start: 2019-03-14

## 2019-03-14 RX ORDER — PHENAZOPYRIDINE HCL 100 MG
200 TABLET ORAL THREE TIMES A DAY
Qty: 0 | Refills: 0 | Status: DISCONTINUED | OUTPATIENT
Start: 2019-03-14 | End: 2019-03-14

## 2019-03-14 RX ORDER — OXYBUTYNIN CHLORIDE 5 MG
1 TABLET ORAL
Qty: 15 | Refills: 0 | OUTPATIENT
Start: 2019-03-14

## 2019-03-14 RX ORDER — TAMSULOSIN HYDROCHLORIDE 0.4 MG/1
1 CAPSULE ORAL
Qty: 30 | Refills: 0 | OUTPATIENT
Start: 2019-03-14

## 2019-03-14 RX ORDER — PHENAZOPYRIDINE HCL 100 MG
1 TABLET ORAL
Qty: 6 | Refills: 0 | OUTPATIENT
Start: 2019-03-14 | End: 2019-03-15

## 2019-03-14 RX ORDER — CEFDINIR 250 MG/5ML
1 POWDER, FOR SUSPENSION ORAL
Qty: 20 | Refills: 0 | OUTPATIENT
Start: 2019-03-14 | End: 2019-03-23

## 2019-03-14 RX ORDER — KETOROLAC TROMETHAMINE 30 MG/ML
15 SYRINGE (ML) INJECTION EVERY 6 HOURS
Qty: 0 | Refills: 0 | Status: DISCONTINUED | OUTPATIENT
Start: 2019-03-14 | End: 2019-03-14

## 2019-03-14 RX ORDER — DOCUSATE SODIUM 100 MG
1 CAPSULE ORAL
Qty: 0 | Refills: 0 | COMMUNITY

## 2019-03-14 RX ORDER — OXYBUTYNIN CHLORIDE 5 MG
5 TABLET ORAL EVERY 8 HOURS
Qty: 0 | Refills: 0 | Status: DISCONTINUED | OUTPATIENT
Start: 2019-03-14 | End: 2019-03-14

## 2019-03-14 RX ADMIN — PIPERACILLIN AND TAZOBACTAM 25 GRAM(S): 4; .5 INJECTION, POWDER, LYOPHILIZED, FOR SOLUTION INTRAVENOUS at 06:13

## 2019-03-14 RX ADMIN — HEPARIN SODIUM 5000 UNIT(S): 5000 INJECTION INTRAVENOUS; SUBCUTANEOUS at 06:13

## 2019-03-14 RX ADMIN — Medication 400 MILLIGRAM(S): at 08:04

## 2019-03-14 NOTE — DISCHARGE NOTE PROVIDER - HOSPITAL COURSE
39 yo F admitted 3/11 with renal colic secondary large right UPJ calculus, pt became febrile and hypotensive after admission and was taken emergently to the OR for ureteral stent placement, placed on zosyn. POD #1 pt required fluid bolus for low urine output with appropriate response otherwise remained afebrile with stable BP.  Pt treated with ditropan, flomax and pyridium for stent colic with good response.  Bcx NGTD, Ucx Ecoli. Pt d/c on 3/13 to complete 14 day course of cefdinir.  Pt will be scheduled for stone removal on 3/26/19.  I-stop checked.

## 2019-03-14 NOTE — PROGRESS NOTE ADULT - SUBJECTIVE AND OBJECTIVE BOX
Overnight events:  None, remained afebrile    Subjective:  Pt c/o stent colic, and now with loose stools, no appetite but tolerating liquids well    Objective:    Vital signs  T(C): , Max: 37.4 (03-13-19 @ 20:45)  HR: 76 (03-14-19 @ 06:02)  BP: 100/51 (03-14-19 @ 06:02)  SpO2: 98% (03-14-19 @ 06:02)  Wt(kg): --    Output   Void: 675        Gen: NAD  Abd: soft, nontender      Labs: none today                                    Urine Cx:   Culture - Urine (03.12.19 @ 11:10)    Culture - Urine:   EC^Escherichia coli  COLONY COUNT: MODERATE    Specimen Source: URINE KIDNEY    Culture - Urine (03.12.19 @ 11:10)    Culture - Urine:   EC^Escherichia coli  COLONY COUNT: MODERATE    Specimen Source: URINE KIDNEY    Culture - Blood (03.12.19 @ 06:44)    Culture - Blood:   NO ORGANISMS ISOLATED  NO ORGANISMS ISOLATED AT 48 HRS.    Specimen Source: BLOOD VENOUS      Blood Cx:     Imaging

## 2019-03-14 NOTE — DISCHARGE NOTE PROVIDER - PROVIDER TOKENS
FREE:[LAST:[Urology Clinic],PHONE:[phone]],FAX:[(   [phone]    -],ADDRESS:[[address] [place] [address]]]

## 2019-03-14 NOTE — DISCHARGE NOTE PROVIDER - NSDCFUADDINST_GEN_ALL_CORE_FT
The clinic will call you with instructions regarding your surgery on 3/26/19 to removal the stone.  Take antibiotics until finished.  Call the office if you have fever greater than 101, difficulty urinating, pain not relieved with pain medication, nausea/vomiting. The clinic will call you with instructions regarding your surgery on 3/26/19 to removal the stone.  Take antibiotics until finished.  You may have intermittent blood tinged urine and slight flank pain when you urinate.  This is normal and due to the stent in your ureter.   If your urine becomes bright red or with clots, please call the office.  Call the office if you have fever greater than 101, difficulty urinating, pain not relieved with pain medication, nausea/vomiting.

## 2019-03-14 NOTE — PROGRESS NOTE ADULT - ASSESSMENT
37 yo F admitted 3/11 with L renal colic secondary to a 1cm obstructing right proximal ureteral calculus.  Febrile overnight 3/12 -> taken emergently for left ureteral stent.  Given IV bolus for low urine output 3/13 with appropriate response, OR cx Ecoli    -- f/u final urine and blood cultures  -- add ditropan, pyridium  -- d/c bowel regimen  -- regular diet  -- continue zosyn   -- will f/u in clinic, OR on 3/26  -- discharge later today pending sensitivities 37 yo F admitted 3/11 with L renal colic secondary to a 1cm obstructing right proximal ureteral calculus.  Febrile overnight 3/12 -> taken emergently for left ureteral stent.  Given IV bolus for low urine output 3/13 with appropriate response, OR cx Ecoli    -- f/u final urine and blood cultures  -- add ditropan, pyridium, ibuprofen  -- d/c bowel regimen  -- regular diet  -- continue zosyn   -- will f/u in clinic, OR on 3/26  -- discharge later today pending sensitivities

## 2019-03-14 NOTE — DISCHARGE NOTE PROVIDER - CARE PROVIDER_API CALL
Urology Clinic,   [address] [place] [address]  Phone: [phone]  Fax: (   )    -  Follow Up Time:

## 2019-03-14 NOTE — DISCHARGE NOTE PROVIDER - NSDCACTIVITY_GEN_ALL_CORE
Showering allowed/No heavy lifting/straining/Walking - Outdoors allowed/Walking - Indoors allowed/Stairs allowed

## 2019-03-17 LAB
BACTERIA BLD CULT: SIGNIFICANT CHANGE UP
BACTERIA BLD CULT: SIGNIFICANT CHANGE UP

## 2019-03-19 LAB — SPECIMEN SOURCE: SIGNIFICANT CHANGE UP

## 2019-03-25 ENCOUNTER — EMERGENCY (EMERGENCY)
Facility: HOSPITAL | Age: 39
LOS: 1 days | Discharge: LEFT BEFORE DISCHARGE | End: 2019-03-25
Admitting: EMERGENCY MEDICINE

## 2019-03-25 VITALS
DIASTOLIC BLOOD PRESSURE: 71 MMHG | RESPIRATION RATE: 16 BRPM | SYSTOLIC BLOOD PRESSURE: 108 MMHG | OXYGEN SATURATION: 100 % | HEART RATE: 79 BPM | TEMPERATURE: 98 F

## 2019-03-25 DIAGNOSIS — Z90.49 ACQUIRED ABSENCE OF OTHER SPECIFIED PARTS OF DIGESTIVE TRACT: Chronic | ICD-10-CM

## 2019-03-25 DIAGNOSIS — N20.1 CALCULUS OF URETER: ICD-10-CM

## 2019-03-25 PROBLEM — Z00.00 ENCOUNTER FOR PREVENTIVE HEALTH EXAMINATION: Status: ACTIVE | Noted: 2019-03-25

## 2019-03-25 PROBLEM — K35.890 OTHER ACUTE APPENDICITIS WITHOUT PERFORATION OR GANGRENE: Chronic | Status: ACTIVE | Noted: 2019-03-11

## 2019-03-25 NOTE — ED ADULT TRIAGE NOTE - CHIEF COMPLAINT QUOTE
Pt dx with right sided kidney stone 2 weeks ago and had stent placed. Returns today because 4 days ago developed N/V, hematuria, chills, right and left flank pain. Scheduled for stone removal on 4/2/19.

## 2019-03-26 ENCOUNTER — INPATIENT (INPATIENT)
Facility: HOSPITAL | Age: 39
LOS: 7 days | Discharge: HOME | End: 2019-04-03
Attending: INTERNAL MEDICINE | Admitting: INTERNAL MEDICINE
Payer: MEDICAID

## 2019-03-26 VITALS
SYSTOLIC BLOOD PRESSURE: 101 MMHG | DIASTOLIC BLOOD PRESSURE: 61 MMHG | OXYGEN SATURATION: 100 % | HEART RATE: 90 BPM | TEMPERATURE: 98 F | RESPIRATION RATE: 18 BRPM

## 2019-03-26 DIAGNOSIS — Z90.49 ACQUIRED ABSENCE OF OTHER SPECIFIED PARTS OF DIGESTIVE TRACT: Chronic | ICD-10-CM

## 2019-03-26 NOTE — ED ADULT TRIAGE NOTE - CHIEF COMPLAINT QUOTE
Pt c/o right sided flank pain x2 days acc with vomiting, dysuria and hematuria. Pt states she had a stent placed 2 weeks ago due to right sided kidney stone. c/o pain radiating to left flank now. Afebrile. Pt appears uncomfortable.

## 2019-03-27 ENCOUNTER — APPOINTMENT (OUTPATIENT)
Dept: UROLOGY | Facility: CLINIC | Age: 39
End: 2019-03-27

## 2019-03-27 DIAGNOSIS — N20.0 CALCULUS OF KIDNEY: ICD-10-CM

## 2019-03-27 DIAGNOSIS — Z96.0 PRESENCE OF UROGENITAL IMPLANTS: Chronic | ICD-10-CM

## 2019-03-27 DIAGNOSIS — Z29.9 ENCOUNTER FOR PROPHYLACTIC MEASURES, UNSPECIFIED: ICD-10-CM

## 2019-03-27 DIAGNOSIS — N39.0 URINARY TRACT INFECTION, SITE NOT SPECIFIED: ICD-10-CM

## 2019-03-27 DIAGNOSIS — Z90.49 ACQUIRED ABSENCE OF OTHER SPECIFIED PARTS OF DIGESTIVE TRACT: ICD-10-CM

## 2019-03-27 LAB
ALBUMIN SERPL ELPH-MCNC: 4.3 G/DL — SIGNIFICANT CHANGE UP (ref 3.3–5)
ALP SERPL-CCNC: 62 U/L — SIGNIFICANT CHANGE UP (ref 40–120)
ALT FLD-CCNC: 13 U/L — SIGNIFICANT CHANGE UP (ref 4–33)
ANION GAP SERPL CALC-SCNC: 9 MMO/L — SIGNIFICANT CHANGE UP (ref 7–14)
APPEARANCE UR: SIGNIFICANT CHANGE UP
AST SERPL-CCNC: 12 U/L — SIGNIFICANT CHANGE UP (ref 4–32)
BACTERIA # UR AUTO: HIGH
BASE EXCESS BLDV CALC-SCNC: 3.1 MMOL/L — SIGNIFICANT CHANGE UP
BASOPHILS # BLD AUTO: 0.14 K/UL — SIGNIFICANT CHANGE UP (ref 0–0.2)
BASOPHILS NFR BLD AUTO: 1.8 % — SIGNIFICANT CHANGE UP (ref 0–2)
BILIRUB SERPL-MCNC: 0.4 MG/DL — SIGNIFICANT CHANGE UP (ref 0.2–1.2)
BILIRUB UR-MCNC: NEGATIVE — SIGNIFICANT CHANGE UP
BLOOD GAS VENOUS - CREATININE: 0.71 MG/DL — SIGNIFICANT CHANGE UP (ref 0.5–1.3)
BLOOD UR QL VISUAL: HIGH
BUN SERPL-MCNC: 22 MG/DL — SIGNIFICANT CHANGE UP (ref 7–23)
CALCIUM SERPL-MCNC: 9.5 MG/DL — SIGNIFICANT CHANGE UP (ref 8.4–10.5)
CHLORIDE BLDV-SCNC: 105 MMOL/L — SIGNIFICANT CHANGE UP (ref 96–108)
CHLORIDE SERPL-SCNC: 104 MMOL/L — SIGNIFICANT CHANGE UP (ref 98–107)
CO2 SERPL-SCNC: 26 MMOL/L — SIGNIFICANT CHANGE UP (ref 22–31)
COLOR SPEC: YELLOW — SIGNIFICANT CHANGE UP
CREAT SERPL-MCNC: 0.78 MG/DL — SIGNIFICANT CHANGE UP (ref 0.5–1.3)
EOSINOPHIL # BLD AUTO: 0.23 K/UL — SIGNIFICANT CHANGE UP (ref 0–0.5)
EOSINOPHIL NFR BLD AUTO: 3 % — SIGNIFICANT CHANGE UP (ref 0–6)
GAS PNL BLDV: 138 MMOL/L — SIGNIFICANT CHANGE UP (ref 136–146)
GLUCOSE BLDV-MCNC: 100 — HIGH (ref 70–99)
GLUCOSE SERPL-MCNC: 95 MG/DL — SIGNIFICANT CHANGE UP (ref 70–99)
GLUCOSE UR-MCNC: NEGATIVE — SIGNIFICANT CHANGE UP
HCO3 BLDV-SCNC: 26 MMOL/L — SIGNIFICANT CHANGE UP (ref 20–27)
HCT VFR BLD CALC: 36.8 % — SIGNIFICANT CHANGE UP (ref 34.5–45)
HCT VFR BLDV CALC: 36.4 % — SIGNIFICANT CHANGE UP (ref 34.5–45)
HGB BLD-MCNC: 11.7 G/DL — SIGNIFICANT CHANGE UP (ref 11.5–15.5)
HGB BLDV-MCNC: 11.8 G/DL — SIGNIFICANT CHANGE UP (ref 11.5–15.5)
HYALINE CASTS # UR AUTO: SIGNIFICANT CHANGE UP
IMM GRANULOCYTES NFR BLD AUTO: 0.3 % — SIGNIFICANT CHANGE UP (ref 0–1.5)
KETONES UR-MCNC: NEGATIVE — SIGNIFICANT CHANGE UP
LACTATE BLDV-MCNC: 1.2 MMOL/L — SIGNIFICANT CHANGE UP (ref 0.5–2)
LEUKOCYTE ESTERASE UR-ACNC: SIGNIFICANT CHANGE UP
LYMPHOCYTES # BLD AUTO: 2.44 K/UL — SIGNIFICANT CHANGE UP (ref 1–3.3)
LYMPHOCYTES # BLD AUTO: 32.2 % — SIGNIFICANT CHANGE UP (ref 13–44)
MCHC RBC-ENTMCNC: 28.4 PG — SIGNIFICANT CHANGE UP (ref 27–34)
MCHC RBC-ENTMCNC: 31.8 % — LOW (ref 32–36)
MCV RBC AUTO: 89.3 FL — SIGNIFICANT CHANGE UP (ref 80–100)
MONOCYTES # BLD AUTO: 0.99 K/UL — HIGH (ref 0–0.9)
MONOCYTES NFR BLD AUTO: 13.1 % — SIGNIFICANT CHANGE UP (ref 2–14)
NEUTROPHILS # BLD AUTO: 3.75 K/UL — SIGNIFICANT CHANGE UP (ref 1.8–7.4)
NEUTROPHILS NFR BLD AUTO: 49.6 % — SIGNIFICANT CHANGE UP (ref 43–77)
NITRITE UR-MCNC: POSITIVE — HIGH
NRBC # FLD: 0 K/UL — SIGNIFICANT CHANGE UP (ref 0–0)
PCO2 BLDV: 50 MMHG — SIGNIFICANT CHANGE UP (ref 41–51)
PH BLDV: 7.37 PH — SIGNIFICANT CHANGE UP (ref 7.32–7.43)
PH UR: 6.5 — SIGNIFICANT CHANGE UP (ref 5–8)
PLATELET # BLD AUTO: 416 K/UL — HIGH (ref 150–400)
PMV BLD: 10.2 FL — SIGNIFICANT CHANGE UP (ref 7–13)
PO2 BLDV: 34 MMHG — LOW (ref 35–40)
POTASSIUM BLDV-SCNC: 3.7 MMOL/L — SIGNIFICANT CHANGE UP (ref 3.4–4.5)
POTASSIUM SERPL-MCNC: 3.7 MMOL/L — SIGNIFICANT CHANGE UP (ref 3.5–5.3)
POTASSIUM SERPL-SCNC: 3.7 MMOL/L — SIGNIFICANT CHANGE UP (ref 3.5–5.3)
PROT SERPL-MCNC: 7.6 G/DL — SIGNIFICANT CHANGE UP (ref 6–8.3)
PROT UR-MCNC: 200 — HIGH
RBC # BLD: 4.12 M/UL — SIGNIFICANT CHANGE UP (ref 3.8–5.2)
RBC # FLD: 14.7 % — HIGH (ref 10.3–14.5)
RBC CASTS # UR COMP ASSIST: >50 — HIGH (ref 0–?)
SAO2 % BLDV: 58.9 % — LOW (ref 60–85)
SODIUM SERPL-SCNC: 139 MMOL/L — SIGNIFICANT CHANGE UP (ref 135–145)
SP GR SPEC: 1.03 — SIGNIFICANT CHANGE UP (ref 1–1.04)
SQUAMOUS # UR AUTO: SIGNIFICANT CHANGE UP
UROBILINOGEN FLD QL: NORMAL — SIGNIFICANT CHANGE UP
WBC # BLD: 7.57 K/UL — SIGNIFICANT CHANGE UP (ref 3.8–10.5)
WBC # FLD AUTO: 7.57 K/UL — SIGNIFICANT CHANGE UP (ref 3.8–10.5)
WBC UR QL: HIGH (ref 0–?)

## 2019-03-27 PROCEDURE — 76770 US EXAM ABDO BACK WALL COMP: CPT | Mod: 26

## 2019-03-27 PROCEDURE — 99223 1ST HOSP IP/OBS HIGH 75: CPT

## 2019-03-27 RX ORDER — TAMSULOSIN HYDROCHLORIDE 0.4 MG/1
0.4 CAPSULE ORAL AT BEDTIME
Qty: 0 | Refills: 0 | Status: DISCONTINUED | OUTPATIENT
Start: 2019-03-27 | End: 2019-04-03

## 2019-03-27 RX ORDER — KETOROLAC TROMETHAMINE 30 MG/ML
15 SYRINGE (ML) INJECTION ONCE
Qty: 0 | Refills: 0 | Status: DISCONTINUED | OUTPATIENT
Start: 2019-03-27 | End: 2019-03-27

## 2019-03-27 RX ORDER — MORPHINE SULFATE 50 MG/1
2 CAPSULE, EXTENDED RELEASE ORAL EVERY 4 HOURS
Qty: 0 | Refills: 0 | Status: DISCONTINUED | OUTPATIENT
Start: 2019-03-27 | End: 2019-04-02

## 2019-03-27 RX ORDER — PIPERACILLIN AND TAZOBACTAM 4; .5 G/20ML; G/20ML
3.38 INJECTION, POWDER, LYOPHILIZED, FOR SOLUTION INTRAVENOUS ONCE
Qty: 0 | Refills: 0 | Status: COMPLETED | OUTPATIENT
Start: 2019-03-27 | End: 2019-03-27

## 2019-03-27 RX ORDER — ONDANSETRON 8 MG/1
4 TABLET, FILM COATED ORAL ONCE
Qty: 0 | Refills: 0 | Status: COMPLETED | OUTPATIENT
Start: 2019-03-27 | End: 2019-03-27

## 2019-03-27 RX ORDER — SODIUM CHLORIDE 9 MG/ML
1000 INJECTION INTRAMUSCULAR; INTRAVENOUS; SUBCUTANEOUS
Qty: 0 | Refills: 0 | Status: DISCONTINUED | OUTPATIENT
Start: 2019-03-27 | End: 2019-04-03

## 2019-03-27 RX ORDER — CEFTRIAXONE 500 MG/1
1 INJECTION, POWDER, FOR SOLUTION INTRAMUSCULAR; INTRAVENOUS ONCE
Qty: 0 | Refills: 0 | Status: COMPLETED | OUTPATIENT
Start: 2019-03-27 | End: 2019-03-27

## 2019-03-27 RX ORDER — INFLUENZA VIRUS VACCINE 15; 15; 15; 15 UG/.5ML; UG/.5ML; UG/.5ML; UG/.5ML
0.5 SUSPENSION INTRAMUSCULAR ONCE
Qty: 0 | Refills: 0 | Status: DISCONTINUED | OUTPATIENT
Start: 2019-03-27 | End: 2019-04-03

## 2019-03-27 RX ORDER — SODIUM CHLORIDE 9 MG/ML
1000 INJECTION INTRAMUSCULAR; INTRAVENOUS; SUBCUTANEOUS ONCE
Qty: 0 | Refills: 0 | Status: COMPLETED | OUTPATIENT
Start: 2019-03-27 | End: 2019-03-27

## 2019-03-27 RX ORDER — ACETAMINOPHEN 500 MG
650 TABLET ORAL ONCE
Qty: 0 | Refills: 0 | Status: COMPLETED | OUTPATIENT
Start: 2019-03-27 | End: 2019-03-27

## 2019-03-27 RX ORDER — PIPERACILLIN AND TAZOBACTAM 4; .5 G/20ML; G/20ML
3.38 INJECTION, POWDER, LYOPHILIZED, FOR SOLUTION INTRAVENOUS EVERY 8 HOURS
Qty: 0 | Refills: 0 | Status: DISCONTINUED | OUTPATIENT
Start: 2019-03-27 | End: 2019-03-29

## 2019-03-27 RX ADMIN — CEFTRIAXONE 1 GRAM(S): 500 INJECTION, POWDER, FOR SOLUTION INTRAMUSCULAR; INTRAVENOUS at 05:40

## 2019-03-27 RX ADMIN — SODIUM CHLORIDE 100 MILLILITER(S): 9 INJECTION INTRAMUSCULAR; INTRAVENOUS; SUBCUTANEOUS at 21:05

## 2019-03-27 RX ADMIN — ONDANSETRON 4 MILLIGRAM(S): 8 TABLET, FILM COATED ORAL at 00:58

## 2019-03-27 RX ADMIN — SODIUM CHLORIDE 100 MILLILITER(S): 9 INJECTION INTRAMUSCULAR; INTRAVENOUS; SUBCUTANEOUS at 10:12

## 2019-03-27 RX ADMIN — Medication 650 MILLIGRAM(S): at 21:02

## 2019-03-27 RX ADMIN — PIPERACILLIN AND TAZOBACTAM 25 GRAM(S): 4; .5 INJECTION, POWDER, LYOPHILIZED, FOR SOLUTION INTRAVENOUS at 21:02

## 2019-03-27 RX ADMIN — SODIUM CHLORIDE 1000 MILLILITER(S): 9 INJECTION INTRAMUSCULAR; INTRAVENOUS; SUBCUTANEOUS at 00:58

## 2019-03-27 RX ADMIN — PIPERACILLIN AND TAZOBACTAM 200 GRAM(S): 4; .5 INJECTION, POWDER, LYOPHILIZED, FOR SOLUTION INTRAVENOUS at 10:12

## 2019-03-27 RX ADMIN — TAMSULOSIN HYDROCHLORIDE 0.4 MILLIGRAM(S): 0.4 CAPSULE ORAL at 21:02

## 2019-03-27 RX ADMIN — CEFTRIAXONE 100 GRAM(S): 500 INJECTION, POWDER, FOR SOLUTION INTRAMUSCULAR; INTRAVENOUS at 03:33

## 2019-03-27 RX ADMIN — Medication 15 MILLIGRAM(S): at 01:15

## 2019-03-27 RX ADMIN — Medication 650 MILLIGRAM(S): at 22:00

## 2019-03-27 RX ADMIN — Medication 15 MILLIGRAM(S): at 00:58

## 2019-03-27 NOTE — ED PROVIDER NOTE - PHYSICAL EXAMINATION
Gen: Well appearing in NAD  Head: NC/AT  Neck: trachea midline  Resp:  No distress  Abd: soft NT ND  : R CVA TTP > L CVA TTP  Ext: no deformities  Neuro:  A&O appears non focal  Skin:  Warm and dry as visualized

## 2019-03-27 NOTE — ED PROVIDER NOTE - OBJECTIVE STATEMENT
39yo with known R sided 1.2cm stone had stent placed about 2 weeks ago.  Over the last 4 days has been experiencing chills, persistent NV and increasing pain on the R and now L side. Worse with any walking or movement.  Still reporting gross hematuria.  Was scheduled for stent/stone removal today at urology clinic [place] med[place] and will not cover outpatient removal of stent.  She did finish her course of cefdinir

## 2019-03-27 NOTE — H&P ADULT - NSHPLABSRESULTS_GEN_ALL_CORE
CBC Full  -  ( 27 Mar 2019 00:15 )  WBC Count : 7.57 K/uL  RBC Count : 4.12 M/uL  Hemoglobin : 11.7 g/dL  Hematocrit : 36.8 %  Platelet Count - Automated : 416 K/uL  Mean Cell Volume : 89.3 fL  Mean Cell Hemoglobin : 28.4 pg  Mean Cell Hemoglobin Concentration : 31.8 %  Auto Neutrophil # : 3.75 K/uL  Auto Lymphocyte # : 2.44 K/uL  Auto Monocyte # : 0.99 K/uL  Auto Eosinophil # : 0.23 K/uL  Auto Basophil # : 0.14 K/uL  Auto Neutrophil % : 49.6 %  Auto Lymphocyte % : 32.2 %  Auto Monocyte % : 13.1 %  Auto Eosinophil % : 3.0 %  Auto Basophil % : 1.8 %    03-27    139  |  104  |  22  ----------------------------<  95  3.7   |  26  |  0.78    Ca    9.5      27 Mar 2019 00:15    TPro  7.6  /  Alb  4.3  /  TBili  0.4  /  DBili  x   /  AST  12  /  ALT  13  /  AlkPhos  62  03-27    Urinalysis (03.27.19 @ 00:26)    Color: YELLOW    Urine Appearance: Lt TURBID    Glucose: NEGATIVE    Bilirubin: NEGATIVE    Ketone - Urine: NEGATIVE    Specific Gravity: 1.028    Blood: LARGE    pH - Urine: 6.5    Protein, Urine: 200    Urobilinogen: NORMAL    Nitrite: POSITIVE    Leukocyte Esterase Concentration: MODERATE    Red Blood Cell - Urine: >50    White Blood Cell - Urine: 26-50    Hyaline Casts: 1+    Bacteria: MODERATE    Squamous Epithelial: OCC

## 2019-03-27 NOTE — H&P ADULT - NSHPPHYSICALEXAM_GEN_ALL_CORE
Vital Signs Last 24 Hrs  T(C): 36.8 (27 Mar 2019 09:05), Max: 36.8 (26 Mar 2019 22:44)  T(F): 98.2 (27 Mar 2019 09:05), Max: 98.2 (26 Mar 2019 22:44)  HR: 80 (27 Mar 2019 09:05) (74 - 92)  BP: 97/65 (27 Mar 2019 09:05) (97/56 - 107/61)  BP(mean): --  RR: 16 (27 Mar 2019 09:05) (16 - 18)  SpO2: 100% (27 Mar 2019 09:05) (100% - 100%)    General: mildly uncomfortable appearing, no respiratory distress   HEENT: EOMI, PERRLA, no conjunctival pallor, MMM, no JVD, no thyromegaly, neck supple, trachea midline  CV: S1S2 RRR no MRG  Lungs: CTA BL  Abdomen: soft ND +BS, (+) TTP R sided abdomen    : (+) B/L flank tenderness, more pronounced on Right   Extremities: No CCE +WWP  Skin/MSK: No rashes, preserved ROM on active & passive movement  Neuro: AAOx3, no focal deficits (5/5 strength all extremities), no sensory deficits

## 2019-03-27 NOTE — CONSULT NOTE ADULT - SUBJECTIVE AND OBJECTIVE BOX
HPI  This is a 38 y.o female who is s/p left stent placement on 3/13 due to fever/renal stone. She has been complaining of left renal colic associated with chills,  weakness, nausea and vomiting, dysuria. She denies any fever.    PAST MEDICAL & SURGICAL HISTORY:  Other acute appendicitis  History of appendectomy  s/p L stent placement 3/13      MEDICATIONS  (STANDING):    MEDICATIONS  (PRN):      FAMILY HISTORY:  No pertinent family history in first degree relatives      Allergies    No Known Allergies    Intolerances        SOCIAL HISTORY: NC    REVIEW OF SYSTEMS: Otherwise negative as stated in HPI    Physical Exam  Vital signs  T(F): 98.2 (19 @ 06:34), Max: 98.2 (19 @ 22:44)  HR: 74 (19 @ 06:34)  BP: 97/56 (19 @ 06:34)  SpO2: 100% (19 @ 06:34)    Output    UOP    Gen:  [x] NAD [] toxic    Pulm:  [x] no resp distress [x] no substernal retractions  	  CV:    [x] RRR    GI:  [x] Soft [x] ND, L CVA tenederness                            	  MSK:  Edema []Y [x]N    LABS:       @ 00:15    WBC 7.57  / Hct 36.8  / SCr 0.78       Urinalysis Basic - ( 27 Mar 2019 00:26 )    Color: YELLOW / Appearance: Lt TURBID / S.028 / pH: 6.5  Gluc: NEGATIVE / Ketone: NEGATIVE  / Bili: NEGATIVE / Urobili: NORMAL   Blood: LARGE / Protein: 200 / Nitrite: POSITIVE   Leuk Esterase: MODERATE / RBC: >50 / WBC 26-50   Sq Epi: OCC / Non Sq Epi: x / Bacteria: MODERATE        Urine Cx: p  Blood Cx:    RADIOLOGY: < from: US Kidney and Bladder (19 @ 01:33) >    EXAM:  US KIDNEYS AND BLADDER        PROCEDURE DATE:  Mar 27 2019         INTERPRETATION:  CLINICAL INFORMATION: Right flank pain.    COMPARISON: CT abdomen pelvis 3/11/2019.    TECHNIQUE: Sonography of the kidneys and bladder.     FINDINGS:    Right kidney:  10.6 cm. A right ureteral stent is noted. No renal mass,   hydronephrosis or calculi.    Left kidney:  11.9 cm. No renal mass, hydronephrosis or calculi.    Urinary bladder: Within normal limits.    IMPRESSION:     No hydronephrosis of either kidney. Right ureteral stent in place.              MICHELLE AMOR M.D., RADIOLOGY RESIDENT  This document has been electronically signed.  ESTER GROVE M.D., ATTENDING RADIOLOGIST  This document has been electronically signed. Mar 27 2019  2:33AM                < end of copied text >

## 2019-03-27 NOTE — ED PROVIDER NOTE - CARE PLAN
Principal Discharge DX:	Kidney stone on right side  Secondary Diagnosis:	Urinary tract infection with hematuria, site unspecified

## 2019-03-27 NOTE — CONSULT NOTE ADULT - ASSESSMENT
8 y.o female s/p L stent placement on 3/13, with chills, nausea, vomiting, UA is positive for nitrites

## 2019-03-27 NOTE — ED PROVIDER NOTE - PROGRESS NOTE DETAILS
Urology consulted Urology states it is an infected stone that they cannot remove when infected so should be admitted to medicine for now.  Discussed with hospitalist and they will accept patient

## 2019-03-27 NOTE — H&P ADULT - PROBLEM SELECTOR PLAN 3
IMPROVE VTE Individual Risk Assessment, Score = 0          RISK                                                          Points  [  ] Previous VTE                                               3  [  ] Thrombophilia                                            2  [  ] Lower limb paresis/paralysis                    2    [  ] Active Cancer (in last 6 months)              2   [  ] Immobilization > 24 hrs                             1  [  ] ICU/CCU stay > 24 hours                            1  [  ] Age > 60                                                        1                                            Total Score _____0____

## 2019-03-27 NOTE — H&P ADULT - NSHPREVIEWOFSYSTEMS_GEN_ALL_CORE
REVIEW OF SYSTEMS:    CONSTITUTIONAL: No weakness, fevers. (+) Chills   EYES/ENT: No visual changes;  No vertigo or throat pain   NECK: No pain or stiffness  RESPIRATORY: No cough, wheezing, hemoptysis; No shortness of breath  CARDIOVASCULAR: No chest pain or palpitations  GASTROINTESTINAL: No abdominal or epigastric pain. No nausea, vomiting, or hematemesis; No diarrhea or constipation. No melena or hematochezia.  GENITOURINARY: No dysuria, frequency. (+) Hematuria, (+) R renal colic pain   NEUROLOGICAL: No numbness or weakness  SKIN: No itching, burning, rashes, or lesions   All other review of systems is negative unless indicated above.

## 2019-03-27 NOTE — ED PROVIDER NOTE - CLINICAL SUMMARY MEDICAL DECISION MAKING FREE TEXT BOX
pt with known stent and large stone.  Now with chills and NV and increasing pain.  Need to be concerned for infected stone or a non patent stent.  Will treat pain and get labs while imaging again.  Will require urology consultation as cannot have stent removed in clinic and is here for another few weeks.

## 2019-03-27 NOTE — H&P ADULT - HISTORY OF PRESENT ILLNESS
38F hx of Nephrolithiasis presents to [facility] ED c/o 4 days of hematuria, chills, and renal colic (right sided). [place] [facility]. Two weeks ago (3/11-3/14) had kidney stone requiring inpatient implantation of R ureteral stent. Was discharged on Cefdinir which she completed recently. However she then developed blood in urine with chills and right sided colic pain with mild migration to the left flank. Concerned for infection she came back to the ED. No recorded fevers at home.     In the ED patient was seen by Urology and started on Ceftriaxone 1g IV x 1.

## 2019-03-27 NOTE — CONSULT NOTE ADULT - PROBLEM SELECTOR RECOMMENDATION 9
Urine culture  Antibiotics  IVF  Flomax  No ureteroscopy at this bipin given UTI, pt needs abx first  Case discussed with Dr Jacques/ Dr Conn

## 2019-03-28 LAB
ALBUMIN SERPL ELPH-MCNC: 3.7 G/DL — SIGNIFICANT CHANGE UP (ref 3.3–5)
ALP SERPL-CCNC: 49 U/L — SIGNIFICANT CHANGE UP (ref 40–120)
ALT FLD-CCNC: 13 U/L — SIGNIFICANT CHANGE UP (ref 4–33)
ANION GAP SERPL CALC-SCNC: 8 MMO/L — SIGNIFICANT CHANGE UP (ref 7–14)
AST SERPL-CCNC: 11 U/L — SIGNIFICANT CHANGE UP (ref 4–32)
BASOPHILS # BLD AUTO: 0.09 K/UL — SIGNIFICANT CHANGE UP (ref 0–0.2)
BASOPHILS NFR BLD AUTO: 1.2 % — SIGNIFICANT CHANGE UP (ref 0–2)
BILIRUB SERPL-MCNC: 0.6 MG/DL — SIGNIFICANT CHANGE UP (ref 0.2–1.2)
BUN SERPL-MCNC: 14 MG/DL — SIGNIFICANT CHANGE UP (ref 7–23)
CALCIUM SERPL-MCNC: 8.8 MG/DL — SIGNIFICANT CHANGE UP (ref 8.4–10.5)
CHLORIDE SERPL-SCNC: 105 MMOL/L — SIGNIFICANT CHANGE UP (ref 98–107)
CO2 SERPL-SCNC: 24 MMOL/L — SIGNIFICANT CHANGE UP (ref 22–31)
CREAT SERPL-MCNC: 0.83 MG/DL — SIGNIFICANT CHANGE UP (ref 0.5–1.3)
EOSINOPHIL # BLD AUTO: 0.25 K/UL — SIGNIFICANT CHANGE UP (ref 0–0.5)
EOSINOPHIL NFR BLD AUTO: 3.4 % — SIGNIFICANT CHANGE UP (ref 0–6)
GLUCOSE SERPL-MCNC: 101 MG/DL — HIGH (ref 70–99)
HCT VFR BLD CALC: 34.1 % — LOW (ref 34.5–45)
HGB BLD-MCNC: 10.7 G/DL — LOW (ref 11.5–15.5)
IMM GRANULOCYTES NFR BLD AUTO: 0.3 % — SIGNIFICANT CHANGE UP (ref 0–1.5)
LYMPHOCYTES # BLD AUTO: 2.33 K/UL — SIGNIFICANT CHANGE UP (ref 1–3.3)
LYMPHOCYTES # BLD AUTO: 31.5 % — SIGNIFICANT CHANGE UP (ref 13–44)
MAGNESIUM SERPL-MCNC: 1.9 MG/DL — SIGNIFICANT CHANGE UP (ref 1.6–2.6)
MCHC RBC-ENTMCNC: 28 PG — SIGNIFICANT CHANGE UP (ref 27–34)
MCHC RBC-ENTMCNC: 31.4 % — LOW (ref 32–36)
MCV RBC AUTO: 89.3 FL — SIGNIFICANT CHANGE UP (ref 80–100)
MONOCYTES # BLD AUTO: 0.65 K/UL — SIGNIFICANT CHANGE UP (ref 0–0.9)
MONOCYTES NFR BLD AUTO: 8.8 % — SIGNIFICANT CHANGE UP (ref 2–14)
NEUTROPHILS # BLD AUTO: 4.05 K/UL — SIGNIFICANT CHANGE UP (ref 1.8–7.4)
NEUTROPHILS NFR BLD AUTO: 54.8 % — SIGNIFICANT CHANGE UP (ref 43–77)
NRBC # FLD: 0 K/UL — SIGNIFICANT CHANGE UP (ref 0–0)
PHOSPHATE SERPL-MCNC: 3.6 MG/DL — SIGNIFICANT CHANGE UP (ref 2.5–4.5)
PLATELET # BLD AUTO: 338 K/UL — SIGNIFICANT CHANGE UP (ref 150–400)
PMV BLD: 10.1 FL — SIGNIFICANT CHANGE UP (ref 7–13)
POTASSIUM SERPL-MCNC: 3.5 MMOL/L — SIGNIFICANT CHANGE UP (ref 3.5–5.3)
POTASSIUM SERPL-SCNC: 3.5 MMOL/L — SIGNIFICANT CHANGE UP (ref 3.5–5.3)
PROT SERPL-MCNC: 6.8 G/DL — SIGNIFICANT CHANGE UP (ref 6–8.3)
RBC # BLD: 3.82 M/UL — SIGNIFICANT CHANGE UP (ref 3.8–5.2)
RBC # FLD: 14.6 % — HIGH (ref 10.3–14.5)
SODIUM SERPL-SCNC: 137 MMOL/L — SIGNIFICANT CHANGE UP (ref 135–145)
SPECIMEN SOURCE: SIGNIFICANT CHANGE UP
WBC # BLD: 7.39 K/UL — SIGNIFICANT CHANGE UP (ref 3.8–10.5)
WBC # FLD AUTO: 7.39 K/UL — SIGNIFICANT CHANGE UP (ref 3.8–10.5)

## 2019-03-28 PROCEDURE — 99254 IP/OBS CNSLTJ NEW/EST MOD 60: CPT | Mod: GC

## 2019-03-28 RX ORDER — ACETAMINOPHEN 500 MG
650 TABLET ORAL ONCE
Qty: 0 | Refills: 0 | Status: COMPLETED | OUTPATIENT
Start: 2019-03-28 | End: 2019-03-28

## 2019-03-28 RX ADMIN — PIPERACILLIN AND TAZOBACTAM 25 GRAM(S): 4; .5 INJECTION, POWDER, LYOPHILIZED, FOR SOLUTION INTRAVENOUS at 13:15

## 2019-03-28 RX ADMIN — MORPHINE SULFATE 2 MILLIGRAM(S): 50 CAPSULE, EXTENDED RELEASE ORAL at 17:20

## 2019-03-28 RX ADMIN — SODIUM CHLORIDE 100 MILLILITER(S): 9 INJECTION INTRAMUSCULAR; INTRAVENOUS; SUBCUTANEOUS at 20:18

## 2019-03-28 RX ADMIN — MORPHINE SULFATE 2 MILLIGRAM(S): 50 CAPSULE, EXTENDED RELEASE ORAL at 17:05

## 2019-03-28 RX ADMIN — Medication 650 MILLIGRAM(S): at 20:18

## 2019-03-28 RX ADMIN — Medication 650 MILLIGRAM(S): at 21:10

## 2019-03-28 RX ADMIN — TAMSULOSIN HYDROCHLORIDE 0.4 MILLIGRAM(S): 0.4 CAPSULE ORAL at 21:39

## 2019-03-28 RX ADMIN — PIPERACILLIN AND TAZOBACTAM 25 GRAM(S): 4; .5 INJECTION, POWDER, LYOPHILIZED, FOR SOLUTION INTRAVENOUS at 21:39

## 2019-03-28 RX ADMIN — SODIUM CHLORIDE 100 MILLILITER(S): 9 INJECTION INTRAMUSCULAR; INTRAVENOUS; SUBCUTANEOUS at 07:15

## 2019-03-28 RX ADMIN — MORPHINE SULFATE 2 MILLIGRAM(S): 50 CAPSULE, EXTENDED RELEASE ORAL at 23:39

## 2019-03-28 RX ADMIN — PIPERACILLIN AND TAZOBACTAM 25 GRAM(S): 4; .5 INJECTION, POWDER, LYOPHILIZED, FOR SOLUTION INTRAVENOUS at 05:12

## 2019-03-28 NOTE — CONSULT NOTE ADULT - SUBJECTIVE AND OBJECTIVE BOX
Patient is a 38y old  Female who presents with a chief complaint of Chills, Renal Colic (27 Mar 2019 09:49)      HPI:  38F hx of Nephrolithiasis presents to [facility] ED c/o 4 days of hematuria, chills, and renal colic (right sided). [place] [facility]. Two weeks ago (3/11-3/14) had kidney stone requiring inpatient implantation of R ureteral stent. Was discharged on Cefdinir which she completed recently. However she then developed blood in urine with chills and right sided colic pain with mild migration to the left flank. Concerned for infection she came back to the ED. No recorded fevers at home.     In the ED patient was seen by Urology and started on Ceftriaxone 1g IV x 1. (27 Mar 2019 09:49)  Above reviewed:   Per pt she has been having the colicky abdominal pain, nausea, intermittent vomiting and feeling tired since last admission which hasn't changed. She finished her abx on Sunday and started experiencing dysuria for last 4 days along with new headache. The pain is rt sided, colicky in nature, varies in intensity. Never had hx of stones in past that she knows of. Did have chills but no fever. She feels a little SOB but was evaluated by her doctor who did not find anything, dry cough occasionally. Hx obtained with help of  nay.       PAST MEDICAL & SURGICAL HISTORY:  Nephrolithiasis  Other acute appendicitis  S/P ureteral stent placement  History of appendectomy      REVIEW OF SYSTEMS    General: + chills. Fevers absent    Skin: No rash  	  Ophthalmologic: Denies any visual complaints, discharge, redness.  	  ENT: No nasal congestion or throat pain.     Respiratory and Thorax: Per HPI.   	  Cardiovascular: No chest pain, palpitations.    Gastrointestinal: Per HPI.     Genitourinary: Per HPI.     Musculoskeletal: No joint swelling or pain.    Neurological: No confusion. No extremity weakness.    Psychiatric: No hallucinations	    Endocrine: No abnormal heat/cold intolerance    Allergic/Immunologic: No hives or rash       Social history:  Visiting her sister, no smoking.      FAMILY HISTORY:  Denies any Family history of nephrolithiasis      Allergies  No Known Allergies      Antimicrobials:  piperacillin/tazobactam IVPB. 3.375 Gram(s) IV Intermittent every 8 hours        Vital Signs Last 24 Hrs  T(C): 36.7 (28 Mar 2019 12:26), Max: 36.7 (27 Mar 2019 20:40)  T(F): 98.1 (28 Mar 2019 12:26), Max: 98.1 (28 Mar 2019 12:26)  HR: 72 (28 Mar 2019 12:26) (63 - 75)  BP: 100/65 (28 Mar 2019 12:26) (82/48 - 104/58)  RR: 18 (28 Mar 2019 12:26) (16 - 18)  SpO2: 99% (28 Mar 2019 12:26) (99% - 100%)      PHYSICAL EXAM: Patient in no acute distress.    Constitutional: Comfortable. Awake and alert    Eyes: No discharge or conjunctival injection    ENT: No thrush. No pharyngeal exudate or erythema.    Neck: Supple,     Respiratory: + air entry bilaterally.    Cardiovascular: S1 S2 wnl, No murmurs.    Gastrointestinal: Soft BS(+) + rt sided flank and suprapubic tenderness, non distended.    Genitourinary: Rt CVA tenderness     Extremities: No edema.    Vascular: peripheral pulses felt    Neurological: AAO X 3. No grossly focal deficits.    Skin: No rash     Musculoskeletal: No joint swelling.    Psychiatric: Affect normal.                              10.7   7.39  )-----------( 338      ( 28 Mar 2019 05:51 )             34.1       03-28    137  |  105  |  14  ----------------------------<  101<H>  3.5   |  24  |  0.83    Ca    8.8      28 Mar 2019 05:51  Phos  3.6     03-28  Mg     1.9     03-28    TPro  6.8  /  Alb  3.7  /  TBili  0.6  /  DBili  x   /  AST  11  /  ALT  13  /  AlkPhos  49  03-28        Culture - Blood (collected 27 Mar 2019 01:03)  Source: BLOOD PERIPHERAL  Preliminary Report (28 Mar 2019 01:00):    NO ORGANISMS ISOLATED    NO ORGANISMS ISOLATED AT 24 HOURS    Culture - Blood (collected 27 Mar 2019 01:03)  Source: BLOOD VENOUS  Preliminary Report (28 Mar 2019 01:00):    NO ORGANISMS ISOLATED    NO ORGANISMS ISOLATED AT 24 HOURS    Culture - Urine (collected 27 Mar 2019 01:00)  Source: URINE MIDSTREAM  Preliminary Report (28 Mar 2019 10:16):    EC^Escherichia coli    COLONY COUNT: > = 100,000 CFU/ML        Radiology: Imaging reviewed personally [ x]  < from: US Kidney and Bladder (03.27.19 @ 01:33) >  IMPRESSION:     No hydronephrosis of either kidney. Right ureteral stent in place.

## 2019-03-28 NOTE — PROGRESS NOTE ADULT - ASSESSMENT
38F hx of Nephrolithiasis s/p R ureteral stent placement being admitted for complicated UTI     Problem/Plan - 1:  ·  Problem: Complicated UTI (urinary tract infection).  Plan: c/w Zosyn & IVF, f/u blood and urine cultures.  - Pain control PRN.     Problem/Plan - 2:  ·  Problem: Kidney stone on right side.  Plan: Recommendations as per Urology - currently c/w Abx and Flomax.

## 2019-03-28 NOTE — CONSULT NOTE ADULT - ATTENDING COMMENTS
Nicole Chatman  Pager: [phone]. If no response or past 5 pm call [phone].
agree with plan  patient needs another course of antibiotics   will treat and plan for ureteroscopy when she has negative cultures

## 2019-03-28 NOTE — CHART NOTE - NSCHARTNOTEFT_GEN_A_CORE
Was notified by Nurse that the pt's BP was 82/48. I went to see the patient, she is asymptomatic, denies dizziness, lightheadedness, HA, blurred vision. She is currently on IVF, which we will be continuing. PO intake was encouraged. The patient was notified to let the nurse know immediately if she begins to have dizziness, or lightheadedness. Continuing to monitor.

## 2019-03-28 NOTE — PROGRESS NOTE ADULT - SUBJECTIVE AND OBJECTIVE BOX
Patient is a 38y old  Female who presents with a chief complaint of Chills, Renal Colic (28 Mar 2019 13:31)      INTERVAL HPI/OVERNIGHT EVENTS:  T(C): 36.7 (19 @ 12:26), Max: 36.7 (19 @ 20:40)  HR: 72 (19 @ 12:26) (63 - 75)  BP: 100/65 (19 @ 12:26) (82/48 - 104/58)  RR: 18 (19 @ 12:26) (16 - 18)  SpO2: 99% (19 @ 12:26) (99% - 100%)  Wt(kg): --  I&O's Summary      LABS:                        10.7   7.39  )-----------( 338      ( 28 Mar 2019 05:51 )             34.1         137  |  105  |  14  ----------------------------<  101<H>  3.5   |  24  |  0.83    Ca    8.8      28 Mar 2019 05:51  Phos  3.6       Mg     1.9         TPro  6.8  /  Alb  3.7  /  TBili  0.6  /  DBili  x   /  AST  11  /  ALT  13  /  AlkPhos  49        Urinalysis Basic - ( 27 Mar 2019 00:26 )    Color: YELLOW / Appearance: Lt TURBID / S.028 / pH: 6.5  Gluc: NEGATIVE / Ketone: NEGATIVE  / Bili: NEGATIVE / Urobili: NORMAL   Blood: LARGE / Protein: 200 / Nitrite: POSITIVE   Leuk Esterase: MODERATE / RBC: >50 / WBC 26-50   Sq Epi: OCC / Non Sq Epi: x / Bacteria: MODERATE      CAPILLARY BLOOD GLUCOSE            Urinalysis Basic - ( 27 Mar 2019 00:26 )    Color: YELLOW / Appearance: Lt TURBID / S.028 / pH: 6.5  Gluc: NEGATIVE / Ketone: NEGATIVE  / Bili: NEGATIVE / Urobili: NORMAL   Blood: LARGE / Protein: 200 / Nitrite: POSITIVE   Leuk Esterase: MODERATE / RBC: >50 / WBC 26-50   Sq Epi: OCC / Non Sq Epi: x / Bacteria: MODERATE        MEDICATIONS  (STANDING):  influenza   Vaccine 0.5 milliLiter(s) IntraMuscular once  piperacillin/tazobactam IVPB. 3.375 Gram(s) IV Intermittent every 8 hours  sodium chloride 0.9%. 1000 milliLiter(s) (100 mL/Hr) IV Continuous <Continuous>  tamsulosin 0.4 milliGRAM(s) Oral at bedtime    MEDICATIONS  (PRN):  morphine  - Injectable 2 milliGRAM(s) IV Push every 4 hours PRN Severe Pain (7 - 10)          PHYSICAL EXAM:  GENERAL: NAD, well-groomed, well-developed  HEAD:  Atraumatic, Normocephalic  CHEST/LUNG: Clear to percussion bilaterally; No rales, rhonchi, wheezing, or rubs  HEART: Regular rate and rhythm; No murmurs, rubs, or gallops  ABDOMEN: Soft, Nontender, Nondistended; Bowel sounds present  EXTREMITIES:  2+ Peripheral Pulses, No clubbing, cyanosis, or edema  LYMPH: No lymphadenopathy noted  SKIN: No rashes or lesions    Care Discussed with Consultants/Other Providers [ ] YES  [ ] NO

## 2019-03-28 NOTE — CONSULT NOTE ADULT - ASSESSMENT
38F hx of Nephrolithiasis presents to [facility] ED c/o 4 days of hematuria, chills, and renal colic (right sided).   No fever or leucocytosis  Appears non toxic.   Recurrent UTI in setting of stent and nephrolithiasis.   Given persistent discomfort even with the stent, likely would benefit from stone extraction and stent removal.     Plan:   Continue with Zosyn for now.   Follow up urine culture, final sensitivity  Follow up blood cx, ntd  Urology on board.

## 2019-03-29 LAB
-  AMIKACIN: SIGNIFICANT CHANGE UP
-  AMPICILLIN/SULBACTAM: SIGNIFICANT CHANGE UP
-  AMPICILLIN: SIGNIFICANT CHANGE UP
-  AZTREONAM: SIGNIFICANT CHANGE UP
-  CEFAZOLIN: SIGNIFICANT CHANGE UP
-  CEFEPIME: SIGNIFICANT CHANGE UP
-  CEFOXITIN: SIGNIFICANT CHANGE UP
-  CEFTAZIDIME: SIGNIFICANT CHANGE UP
-  CEFTRIAXONE: SIGNIFICANT CHANGE UP
-  CIPROFLOXACIN: SIGNIFICANT CHANGE UP
-  ERTAPENEM: SIGNIFICANT CHANGE UP
-  GENTAMICIN: SIGNIFICANT CHANGE UP
-  IMIPENEM: SIGNIFICANT CHANGE UP
-  LEVOFLOXACIN: SIGNIFICANT CHANGE UP
-  MEROPENEM: SIGNIFICANT CHANGE UP
-  NITROFURANTOIN: SIGNIFICANT CHANGE UP
-  PIPERACILLIN/TAZOBACTAM: SIGNIFICANT CHANGE UP
-  TIGECYCLINE: SIGNIFICANT CHANGE UP
-  TOBRAMYCIN: SIGNIFICANT CHANGE UP
-  TRIMETHOPRIM/SULFAMETHOXAZOLE: SIGNIFICANT CHANGE UP
ANION GAP SERPL CALC-SCNC: 10 MMO/L — SIGNIFICANT CHANGE UP (ref 7–14)
BACTERIA UR CULT: SIGNIFICANT CHANGE UP
BUN SERPL-MCNC: 15 MG/DL — SIGNIFICANT CHANGE UP (ref 7–23)
CALCIUM SERPL-MCNC: 9 MG/DL — SIGNIFICANT CHANGE UP (ref 8.4–10.5)
CHLORIDE SERPL-SCNC: 105 MMOL/L — SIGNIFICANT CHANGE UP (ref 98–107)
CO2 SERPL-SCNC: 22 MMOL/L — SIGNIFICANT CHANGE UP (ref 22–31)
CREAT SERPL-MCNC: 0.89 MG/DL — SIGNIFICANT CHANGE UP (ref 0.5–1.3)
GLUCOSE SERPL-MCNC: 84 MG/DL — SIGNIFICANT CHANGE UP (ref 70–99)
HCT VFR BLD CALC: 37.6 % — SIGNIFICANT CHANGE UP (ref 34.5–45)
HGB BLD-MCNC: 11.8 G/DL — SIGNIFICANT CHANGE UP (ref 11.5–15.5)
MCHC RBC-ENTMCNC: 28.2 PG — SIGNIFICANT CHANGE UP (ref 27–34)
MCHC RBC-ENTMCNC: 31.4 % — LOW (ref 32–36)
MCV RBC AUTO: 89.7 FL — SIGNIFICANT CHANGE UP (ref 80–100)
METHOD TYPE: SIGNIFICANT CHANGE UP
NRBC # FLD: 0 K/UL — SIGNIFICANT CHANGE UP (ref 0–0)
ORGANISM # SPEC MICROSCOPIC CNT: SIGNIFICANT CHANGE UP
ORGANISM # SPEC MICROSCOPIC CNT: SIGNIFICANT CHANGE UP
PLATELET # BLD AUTO: 335 K/UL — SIGNIFICANT CHANGE UP (ref 150–400)
PMV BLD: 10 FL — SIGNIFICANT CHANGE UP (ref 7–13)
POTASSIUM SERPL-MCNC: 3.8 MMOL/L — SIGNIFICANT CHANGE UP (ref 3.5–5.3)
POTASSIUM SERPL-SCNC: 3.8 MMOL/L — SIGNIFICANT CHANGE UP (ref 3.5–5.3)
RBC # BLD: 4.19 M/UL — SIGNIFICANT CHANGE UP (ref 3.8–5.2)
RBC # FLD: 14.7 % — HIGH (ref 10.3–14.5)
SODIUM SERPL-SCNC: 137 MMOL/L — SIGNIFICANT CHANGE UP (ref 135–145)
WBC # BLD: 7.82 K/UL — SIGNIFICANT CHANGE UP (ref 3.8–10.5)
WBC # FLD AUTO: 7.82 K/UL — SIGNIFICANT CHANGE UP (ref 3.8–10.5)

## 2019-03-29 PROCEDURE — 99232 SBSQ HOSP IP/OBS MODERATE 35: CPT

## 2019-03-29 RX ORDER — ONDANSETRON 8 MG/1
4 TABLET, FILM COATED ORAL EVERY 6 HOURS
Qty: 0 | Refills: 0 | Status: DISCONTINUED | OUTPATIENT
Start: 2019-03-29 | End: 2019-04-03

## 2019-03-29 RX ORDER — CEFTRIAXONE 500 MG/1
1 INJECTION, POWDER, FOR SOLUTION INTRAMUSCULAR; INTRAVENOUS EVERY 24 HOURS
Qty: 0 | Refills: 0 | Status: DISCONTINUED | OUTPATIENT
Start: 2019-03-29 | End: 2019-04-03

## 2019-03-29 RX ADMIN — ONDANSETRON 4 MILLIGRAM(S): 8 TABLET, FILM COATED ORAL at 20:35

## 2019-03-29 RX ADMIN — MORPHINE SULFATE 2 MILLIGRAM(S): 50 CAPSULE, EXTENDED RELEASE ORAL at 00:00

## 2019-03-29 RX ADMIN — MORPHINE SULFATE 2 MILLIGRAM(S): 50 CAPSULE, EXTENDED RELEASE ORAL at 16:05

## 2019-03-29 RX ADMIN — CEFTRIAXONE 100 GRAM(S): 500 INJECTION, POWDER, FOR SOLUTION INTRAMUSCULAR; INTRAVENOUS at 21:10

## 2019-03-29 RX ADMIN — PIPERACILLIN AND TAZOBACTAM 25 GRAM(S): 4; .5 INJECTION, POWDER, LYOPHILIZED, FOR SOLUTION INTRAVENOUS at 13:26

## 2019-03-29 RX ADMIN — ONDANSETRON 4 MILLIGRAM(S): 8 TABLET, FILM COATED ORAL at 11:55

## 2019-03-29 RX ADMIN — SODIUM CHLORIDE 100 MILLILITER(S): 9 INJECTION INTRAMUSCULAR; INTRAVENOUS; SUBCUTANEOUS at 20:35

## 2019-03-29 RX ADMIN — MORPHINE SULFATE 2 MILLIGRAM(S): 50 CAPSULE, EXTENDED RELEASE ORAL at 15:52

## 2019-03-29 RX ADMIN — MORPHINE SULFATE 2 MILLIGRAM(S): 50 CAPSULE, EXTENDED RELEASE ORAL at 11:09

## 2019-03-29 RX ADMIN — TAMSULOSIN HYDROCHLORIDE 0.4 MILLIGRAM(S): 0.4 CAPSULE ORAL at 21:10

## 2019-03-29 RX ADMIN — MORPHINE SULFATE 2 MILLIGRAM(S): 50 CAPSULE, EXTENDED RELEASE ORAL at 10:56

## 2019-03-29 RX ADMIN — PIPERACILLIN AND TAZOBACTAM 25 GRAM(S): 4; .5 INJECTION, POWDER, LYOPHILIZED, FOR SOLUTION INTRAVENOUS at 05:16

## 2019-03-29 NOTE — PROGRESS NOTE ADULT - ASSESSMENT
Problem/Plan - 1:  ·  Problem: Complicated UTI (urinary tract infection).  Plan: c/w Zosyn & IVF, f/u blood and urine cultures.  - Pain control PRN.     Problem/Plan - 2:  ·  Problem: Kidney stone on right side.  Plan: Recommendations as per Urology - currently c/w Abx and Flomax.   stone extraction as per urology

## 2019-03-29 NOTE — PROGRESS NOTE ADULT - ASSESSMENT
38F hx of Nephrolithiasis presents to [facility] ED c/o 4 days of hematuria, chills, and renal colic (right sided).   No fever or leucocytosis  Appears non toxic.   Recurrent UTI in setting of stent and nephrolithiasis.   Given persistent discomfort even with the stent, likely would benefit from stone extraction and stent removal.   E coli very sensitive.       Plan:   Switched Zosyn to Ceftriaxone 1 gm iv q24h   Will keep abx for 48 hrs after the procedure.   If needed abx can be switched to po. Cefpodoxime 200 mg q12h   Follow up blood cx, ntd  Urology on board, plan for stone removal Tuesday.

## 2019-03-29 NOTE — PROGRESS NOTE ADULT - ATTENDING COMMENTS
Nicole Chatman  Pager: [phone]. If no response or past 5 pm call [phone].     Please call ID service for questions over weekend at [phone].

## 2019-03-29 NOTE — PROGRESS NOTE ADULT - SUBJECTIVE AND OBJECTIVE BOX
Subjective    Patient seen and examined. No overnight events. Tolerating diet, no fevers. Feels improved on antibiotics    Objective    Vital signs  T(F): , Max: 98.4 (03-29-19 @ 06:34)  HR: 68 (03-29-19 @ 06:34)  BP: 101/60 (03-29-19 @ 06:34)  SpO2: 100% (03-29-19 @ 06:34)  Wt(kg): --    Output       General: NAD  Abdomen: soft/non-tender/non-distended      Labs      03-29 @ 06:30    WBC 7.82  / Hct 37.6  / SCr --       03-29 @ 05:30    WBC --    / Hct --    / SCr 0.89     Culture - Blood (03.27.19 @ 01:03)    Culture - Blood:   NO ORGANISMS ISOLATED  NO ORGANISMS ISOLATED AT 48 HRS.    Specimen Source: BLOOD PERIPHERAL    Culture - Urine (03.27.19 @ 01:00)    Culture - Urine:   EC^Escherichia coli  COLONY COUNT: > = 100,000 CFU/ML    Specimen Source: URINE MIDSTREAM

## 2019-03-29 NOTE — PROGRESS NOTE ADULT - ASSESSMENT
38 y.o female s/p L stent placement on 3/13 for stone, readmitted for UTI.  Feeling improved on antibiotics    Plan:  --continue antibiotics, f/up urine culture sensitivities  --flomax daily PRN stent discomfort  --patient is scheduled for stone removal on Tuesday 4/02/19 at 4PM at [facility]  --can be discharged on oral antibiotics, return Tuesday for procedure  --please call if questions

## 2019-03-29 NOTE — PROGRESS NOTE ADULT - SUBJECTIVE AND OBJECTIVE BOX
38y old  Female who presents with a chief complaint of Chills, Renal Colic (29 Mar 2019 19:26)      Interval history:  Afebrile, continues to complain of colicky abdominal pain.     No Known Allergies    Antimicrobials:  piperacillin/tazobactam IVPB. 3.375 Gram(s) IV Intermittent every 8 hours    REVIEW OF SYSTEMS:  No chest pain   No cough, no SOB  No rash.     Vital Signs Last 24 Hrs  T(C): 36.6 (03-29-19 @ 12:31), Max: 37 (03-29-19 @ 10:57)  T(F): 97.9 (03-29-19 @ 12:31), Max: 98.6 (03-29-19 @ 10:57)  HR: 68 (03-29-19 @ 16:05) (64 - 82)  BP: 99/59 (03-29-19 @ 16:05) (99/59 - 104/68)  RR: 18 (03-29-19 @ 12:31) (17 - 20)  SpO2: 100% (03-29-19 @ 12:31) (99% - 100%)    PHYSICAL EXAM:  Patient in no acute distress. AAOX3.  No icterus, no oral ulcers.  Cardiovascular: S1S2 normal.  Lungs: + air entry B/L lung fields.  Gastrointestinal: soft, + Rt sided tenderness, nondistended.  Extremities: no edema.   IV sites not inflamed.                           11.8   7.82  )-----------( 335      ( 29 Mar 2019 06:30 )             37.6   03-29    137  |  105  |  15  ----------------------------<  84  3.8   |  22  |  0.89    Ca    9.0      29 Mar 2019 05:30  Phos  3.6     03-28  Mg     1.9     03-28    TPro  6.8  /  Alb  3.7  /  TBili  0.6  /  DBili  x   /  AST  11  /  ALT  13  /  AlkPhos  49  03-28      LIVER FUNCTIONS - ( 28 Mar 2019 05:51 )  Alb: 3.7 g/dL / Pro: 6.8 g/dL / ALK PHOS: 49 u/L / ALT: 13 u/L / AST: 11 u/L / GGT: x               Culture - Blood (collected 27 Mar 2019 01:03)  Source: BLOOD PERIPHERAL  Preliminary Report (29 Mar 2019 01:01):    NO ORGANISMS ISOLATED    NO ORGANISMS ISOLATED AT 48 HRS.    Culture - Blood (collected 27 Mar 2019 01:03)  Source: BLOOD VENOUS  Preliminary Report (29 Mar 2019 01:01):    NO ORGANISMS ISOLATED    NO ORGANISMS ISOLATED AT 48 HRS.    Culture - Urine (collected 27 Mar 2019 01:00)  Source: URINE MIDSTREAM  Final Report (29 Mar 2019 13:35):    COLONY COUNT: > = 100,000 CFU/ML  Organism: Escherichia coli (29 Mar 2019 13:35)  Organism: Escherichia coli (29 Mar 2019 13:35)

## 2019-03-30 LAB
ANION GAP SERPL CALC-SCNC: 9 MMO/L — SIGNIFICANT CHANGE UP (ref 7–14)
BUN SERPL-MCNC: 13 MG/DL — SIGNIFICANT CHANGE UP (ref 7–23)
CALCIUM SERPL-MCNC: 8.6 MG/DL — SIGNIFICANT CHANGE UP (ref 8.4–10.5)
CHLORIDE SERPL-SCNC: 106 MMOL/L — SIGNIFICANT CHANGE UP (ref 98–107)
CO2 SERPL-SCNC: 23 MMOL/L — SIGNIFICANT CHANGE UP (ref 22–31)
CREAT SERPL-MCNC: 0.76 MG/DL — SIGNIFICANT CHANGE UP (ref 0.5–1.3)
GLUCOSE SERPL-MCNC: 80 MG/DL — SIGNIFICANT CHANGE UP (ref 70–99)
HCT VFR BLD CALC: 35.6 % — SIGNIFICANT CHANGE UP (ref 34.5–45)
HGB BLD-MCNC: 11 G/DL — LOW (ref 11.5–15.5)
MCHC RBC-ENTMCNC: 28.1 PG — SIGNIFICANT CHANGE UP (ref 27–34)
MCHC RBC-ENTMCNC: 30.9 % — LOW (ref 32–36)
MCV RBC AUTO: 90.8 FL — SIGNIFICANT CHANGE UP (ref 80–100)
NRBC # FLD: 0 K/UL — SIGNIFICANT CHANGE UP (ref 0–0)
PLATELET # BLD AUTO: 279 K/UL — SIGNIFICANT CHANGE UP (ref 150–400)
PMV BLD: 9.7 FL — SIGNIFICANT CHANGE UP (ref 7–13)
POTASSIUM SERPL-MCNC: 3.8 MMOL/L — SIGNIFICANT CHANGE UP (ref 3.5–5.3)
POTASSIUM SERPL-SCNC: 3.8 MMOL/L — SIGNIFICANT CHANGE UP (ref 3.5–5.3)
RBC # BLD: 3.92 M/UL — SIGNIFICANT CHANGE UP (ref 3.8–5.2)
RBC # FLD: 14.5 % — SIGNIFICANT CHANGE UP (ref 10.3–14.5)
SODIUM SERPL-SCNC: 138 MMOL/L — SIGNIFICANT CHANGE UP (ref 135–145)
WBC # BLD: 6.66 K/UL — SIGNIFICANT CHANGE UP (ref 3.8–10.5)
WBC # FLD AUTO: 6.66 K/UL — SIGNIFICANT CHANGE UP (ref 3.8–10.5)

## 2019-03-30 RX ADMIN — ONDANSETRON 4 MILLIGRAM(S): 8 TABLET, FILM COATED ORAL at 13:42

## 2019-03-30 RX ADMIN — TAMSULOSIN HYDROCHLORIDE 0.4 MILLIGRAM(S): 0.4 CAPSULE ORAL at 21:19

## 2019-03-30 RX ADMIN — CEFTRIAXONE 100 GRAM(S): 500 INJECTION, POWDER, FOR SOLUTION INTRAMUSCULAR; INTRAVENOUS at 21:19

## 2019-03-30 RX ADMIN — SODIUM CHLORIDE 100 MILLILITER(S): 9 INJECTION INTRAMUSCULAR; INTRAVENOUS; SUBCUTANEOUS at 21:19

## 2019-03-30 RX ADMIN — ONDANSETRON 4 MILLIGRAM(S): 8 TABLET, FILM COATED ORAL at 20:21

## 2019-03-30 NOTE — PROGRESS NOTE ADULT - SUBJECTIVE AND OBJECTIVE BOX
Patient is a 38y old  Female who presents with a chief complaint of Chills, Renal Colic (29 Mar 2019 20:11)      INTERVAL HPI/OVERNIGHT EVENTS:  T(C): 36.8 (03-30-19 @ 12:40), Max: 36.8 (03-30-19 @ 12:40)  HR: 69 (03-30-19 @ 12:40) (64 - 70)  BP: 110/54 (03-30-19 @ 12:40) (68/64 - 110/54)  RR: 18 (03-30-19 @ 12:40) (17 - 18)  SpO2: 98% (03-30-19 @ 12:40) (98% - 100%)  Wt(kg): --  I&O's Summary      LABS:                        11.0   6.66  )-----------( 279      ( 30 Mar 2019 07:45 )             35.6     03-30    138  |  106  |  13  ----------------------------<  80  3.8   |  23  |  0.76    Ca    8.6      30 Mar 2019 07:45          CAPILLARY BLOOD GLUCOSE                MEDICATIONS  (STANDING):  cefTRIAXone   IVPB 1 Gram(s) IV Intermittent every 24 hours  influenza   Vaccine 0.5 milliLiter(s) IntraMuscular once  sodium chloride 0.9%. 1000 milliLiter(s) (100 mL/Hr) IV Continuous <Continuous>  tamsulosin 0.4 milliGRAM(s) Oral at bedtime    MEDICATIONS  (PRN):  morphine  - Injectable 2 milliGRAM(s) IV Push every 4 hours PRN Severe Pain (7 - 10)  ondansetron Injectable 4 milliGRAM(s) IV Push every 6 hours PRN Nausea and/or Vomiting

## 2019-03-31 LAB
ANION GAP SERPL CALC-SCNC: 9 MMO/L — SIGNIFICANT CHANGE UP (ref 7–14)
BUN SERPL-MCNC: 13 MG/DL — SIGNIFICANT CHANGE UP (ref 7–23)
C DIFF TOX GENS STL QL NAA+PROBE: SIGNIFICANT CHANGE UP
CALCIUM SERPL-MCNC: 8.9 MG/DL — SIGNIFICANT CHANGE UP (ref 8.4–10.5)
CHLORIDE SERPL-SCNC: 105 MMOL/L — SIGNIFICANT CHANGE UP (ref 98–107)
CO2 SERPL-SCNC: 24 MMOL/L — SIGNIFICANT CHANGE UP (ref 22–31)
CREAT SERPL-MCNC: 0.73 MG/DL — SIGNIFICANT CHANGE UP (ref 0.5–1.3)
GLUCOSE SERPL-MCNC: 80 MG/DL — SIGNIFICANT CHANGE UP (ref 70–99)
HCT VFR BLD CALC: 35.7 % — SIGNIFICANT CHANGE UP (ref 34.5–45)
HGB BLD-MCNC: 11.4 G/DL — LOW (ref 11.5–15.5)
MCHC RBC-ENTMCNC: 28.3 PG — SIGNIFICANT CHANGE UP (ref 27–34)
MCHC RBC-ENTMCNC: 31.9 % — LOW (ref 32–36)
MCV RBC AUTO: 88.6 FL — SIGNIFICANT CHANGE UP (ref 80–100)
NRBC # FLD: 0 K/UL — SIGNIFICANT CHANGE UP (ref 0–0)
PLATELET # BLD AUTO: 260 K/UL — SIGNIFICANT CHANGE UP (ref 150–400)
PMV BLD: 10.1 FL — SIGNIFICANT CHANGE UP (ref 7–13)
POTASSIUM SERPL-MCNC: 3.6 MMOL/L — SIGNIFICANT CHANGE UP (ref 3.5–5.3)
POTASSIUM SERPL-SCNC: 3.6 MMOL/L — SIGNIFICANT CHANGE UP (ref 3.5–5.3)
RBC # BLD: 4.03 M/UL — SIGNIFICANT CHANGE UP (ref 3.8–5.2)
RBC # FLD: 14.4 % — SIGNIFICANT CHANGE UP (ref 10.3–14.5)
SODIUM SERPL-SCNC: 138 MMOL/L — SIGNIFICANT CHANGE UP (ref 135–145)
WBC # BLD: 6.44 K/UL — SIGNIFICANT CHANGE UP (ref 3.8–10.5)
WBC # FLD AUTO: 6.44 K/UL — SIGNIFICANT CHANGE UP (ref 3.8–10.5)

## 2019-03-31 RX ORDER — LACTOBACILLUS ACIDOPHILUS 100MM CELL
1 CAPSULE ORAL
Qty: 0 | Refills: 0 | Status: DISCONTINUED | OUTPATIENT
Start: 2019-03-31 | End: 2019-04-03

## 2019-03-31 RX ADMIN — Medication 1 TABLET(S): at 14:43

## 2019-03-31 RX ADMIN — MORPHINE SULFATE 2 MILLIGRAM(S): 50 CAPSULE, EXTENDED RELEASE ORAL at 15:43

## 2019-03-31 RX ADMIN — MORPHINE SULFATE 2 MILLIGRAM(S): 50 CAPSULE, EXTENDED RELEASE ORAL at 14:43

## 2019-03-31 RX ADMIN — ONDANSETRON 4 MILLIGRAM(S): 8 TABLET, FILM COATED ORAL at 10:56

## 2019-03-31 RX ADMIN — MORPHINE SULFATE 2 MILLIGRAM(S): 50 CAPSULE, EXTENDED RELEASE ORAL at 21:05

## 2019-03-31 RX ADMIN — TAMSULOSIN HYDROCHLORIDE 0.4 MILLIGRAM(S): 0.4 CAPSULE ORAL at 21:14

## 2019-03-31 RX ADMIN — MORPHINE SULFATE 2 MILLIGRAM(S): 50 CAPSULE, EXTENDED RELEASE ORAL at 10:56

## 2019-03-31 RX ADMIN — MORPHINE SULFATE 2 MILLIGRAM(S): 50 CAPSULE, EXTENDED RELEASE ORAL at 21:20

## 2019-03-31 RX ADMIN — MORPHINE SULFATE 2 MILLIGRAM(S): 50 CAPSULE, EXTENDED RELEASE ORAL at 11:56

## 2019-03-31 RX ADMIN — CEFTRIAXONE 100 GRAM(S): 500 INJECTION, POWDER, FOR SOLUTION INTRAMUSCULAR; INTRAVENOUS at 21:04

## 2019-03-31 NOTE — PROGRESS NOTE ADULT - ASSESSMENT
Problem/Plan - 1:  ·  Problem: Complicated UTI (urinary tract infection).  Plan: c/w Zosyn & IVF, f/u blood and urine cultures.  - Pain control PRN.     Problem/Plan - 2:  ·  Problem: Kidney stone on right side.  Plan: Recommendations as per Urology - currently c/w Abx and Flomax.

## 2019-03-31 NOTE — PROGRESS NOTE ADULT - SUBJECTIVE AND OBJECTIVE BOX
Patient is a 38y old  Female who presents with a chief complaint of Chills, Renal Colic (30 Mar 2019 20:07)      INTERVAL HPI/OVERNIGHT EVENTS:  T(C): 36.7 (03-31-19 @ 14:41), Max: 36.9 (03-30-19 @ 20:41)  HR: 73 (03-31-19 @ 14:41) (60 - 85)  BP: 99/64 (03-31-19 @ 14:41) (90/53 - 107/62)  RR: 18 (03-31-19 @ 14:41) (18 - 18)  SpO2: 100% (03-31-19 @ 14:41) (98% - 100%)  Wt(kg): --  I&O's Summary      LABS:                        11.4   6.44  )-----------( 260      ( 31 Mar 2019 05:43 )             35.7     03-31    138  |  105  |  13  ----------------------------<  80  3.6   |  24  |  0.73    Ca    8.9      31 Mar 2019 05:43          CAPILLARY BLOOD GLUCOSE                MEDICATIONS  (STANDING):  cefTRIAXone   IVPB 1 Gram(s) IV Intermittent every 24 hours  influenza   Vaccine 0.5 milliLiter(s) IntraMuscular once  lactobacillus acidophilus 1 Tablet(s) Oral three times a day with meals  sodium chloride 0.9%. 1000 milliLiter(s) (100 mL/Hr) IV Continuous <Continuous>  tamsulosin 0.4 milliGRAM(s) Oral at bedtime    MEDICATIONS  (PRN):  morphine  - Injectable 2 milliGRAM(s) IV Push every 4 hours PRN Severe Pain (7 - 10)  ondansetron Injectable 4 milliGRAM(s) IV Push every 6 hours PRN Nausea and/or Vomiting          PHYSICAL EXAM:  GENERAL: NAD, well-groomed, well-developed  HEAD:  Atraumatic, Normocephalic  CHEST/LUNG: Clear to percussion bilaterally; No rales, rhonchi, wheezing, or rubs  HEART: Regular rate and rhythm; No murmurs, rubs, or gallops  ABDOMEN: Soft, Nontender, Nondistended; Bowel sounds present  EXTREMITIES:  2+ Peripheral Pulses, No clubbing, cyanosis, or edema  LYMPH: No lymphadenopathy noted  SKIN: No rashes or lesions    Care Discussed with Consultants/Other Providers [ ] YES  [ ] NO

## 2019-04-01 ENCOUNTER — TRANSCRIPTION ENCOUNTER (OUTPATIENT)
Age: 39
End: 2019-04-01

## 2019-04-01 LAB
ANION GAP SERPL CALC-SCNC: 10 MMO/L — SIGNIFICANT CHANGE UP (ref 7–14)
BACTERIA BLD CULT: SIGNIFICANT CHANGE UP
BACTERIA BLD CULT: SIGNIFICANT CHANGE UP
BUN SERPL-MCNC: 13 MG/DL — SIGNIFICANT CHANGE UP (ref 7–23)
CALCIUM SERPL-MCNC: 9 MG/DL — SIGNIFICANT CHANGE UP (ref 8.4–10.5)
CHLORIDE SERPL-SCNC: 105 MMOL/L — SIGNIFICANT CHANGE UP (ref 98–107)
CO2 SERPL-SCNC: 21 MMOL/L — LOW (ref 22–31)
CREAT SERPL-MCNC: 0.75 MG/DL — SIGNIFICANT CHANGE UP (ref 0.5–1.3)
GLUCOSE SERPL-MCNC: 82 MG/DL — SIGNIFICANT CHANGE UP (ref 70–99)
HCT VFR BLD CALC: 36.1 % — SIGNIFICANT CHANGE UP (ref 34.5–45)
HGB BLD-MCNC: 11.6 G/DL — SIGNIFICANT CHANGE UP (ref 11.5–15.5)
MCHC RBC-ENTMCNC: 28.1 PG — SIGNIFICANT CHANGE UP (ref 27–34)
MCHC RBC-ENTMCNC: 32.1 % — SIGNIFICANT CHANGE UP (ref 32–36)
MCV RBC AUTO: 87.4 FL — SIGNIFICANT CHANGE UP (ref 80–100)
NRBC # FLD: 0 K/UL — SIGNIFICANT CHANGE UP (ref 0–0)
PLATELET # BLD AUTO: 253 K/UL — SIGNIFICANT CHANGE UP (ref 150–400)
PMV BLD: 10.1 FL — SIGNIFICANT CHANGE UP (ref 7–13)
POTASSIUM SERPL-MCNC: 3.7 MMOL/L — SIGNIFICANT CHANGE UP (ref 3.5–5.3)
POTASSIUM SERPL-SCNC: 3.7 MMOL/L — SIGNIFICANT CHANGE UP (ref 3.5–5.3)
RBC # BLD: 4.13 M/UL — SIGNIFICANT CHANGE UP (ref 3.8–5.2)
RBC # FLD: 14.6 % — HIGH (ref 10.3–14.5)
SODIUM SERPL-SCNC: 136 MMOL/L — SIGNIFICANT CHANGE UP (ref 135–145)
WBC # BLD: 7.1 K/UL — SIGNIFICANT CHANGE UP (ref 3.8–10.5)
WBC # FLD AUTO: 7.1 K/UL — SIGNIFICANT CHANGE UP (ref 3.8–10.5)

## 2019-04-01 PROCEDURE — 99232 SBSQ HOSP IP/OBS MODERATE 35: CPT

## 2019-04-01 RX ADMIN — SODIUM CHLORIDE 100 MILLILITER(S): 9 INJECTION INTRAMUSCULAR; INTRAVENOUS; SUBCUTANEOUS at 23:40

## 2019-04-01 RX ADMIN — Medication 1 TABLET(S): at 08:40

## 2019-04-01 RX ADMIN — SODIUM CHLORIDE 100 MILLILITER(S): 9 INJECTION INTRAMUSCULAR; INTRAVENOUS; SUBCUTANEOUS at 08:40

## 2019-04-01 RX ADMIN — MORPHINE SULFATE 2 MILLIGRAM(S): 50 CAPSULE, EXTENDED RELEASE ORAL at 14:24

## 2019-04-01 RX ADMIN — SODIUM CHLORIDE 100 MILLILITER(S): 9 INJECTION INTRAMUSCULAR; INTRAVENOUS; SUBCUTANEOUS at 17:09

## 2019-04-01 RX ADMIN — Medication 1 TABLET(S): at 17:08

## 2019-04-01 RX ADMIN — ONDANSETRON 4 MILLIGRAM(S): 8 TABLET, FILM COATED ORAL at 17:08

## 2019-04-01 RX ADMIN — Medication 1 TABLET(S): at 12:36

## 2019-04-01 RX ADMIN — CEFTRIAXONE 100 GRAM(S): 500 INJECTION, POWDER, FOR SOLUTION INTRAMUSCULAR; INTRAVENOUS at 23:40

## 2019-04-01 RX ADMIN — MORPHINE SULFATE 2 MILLIGRAM(S): 50 CAPSULE, EXTENDED RELEASE ORAL at 09:32

## 2019-04-01 RX ADMIN — ONDANSETRON 4 MILLIGRAM(S): 8 TABLET, FILM COATED ORAL at 09:17

## 2019-04-01 RX ADMIN — MORPHINE SULFATE 2 MILLIGRAM(S): 50 CAPSULE, EXTENDED RELEASE ORAL at 09:17

## 2019-04-01 RX ADMIN — MORPHINE SULFATE 2 MILLIGRAM(S): 50 CAPSULE, EXTENDED RELEASE ORAL at 14:09

## 2019-04-01 RX ADMIN — SODIUM CHLORIDE 100 MILLILITER(S): 9 INJECTION INTRAMUSCULAR; INTRAVENOUS; SUBCUTANEOUS at 07:28

## 2019-04-01 RX ADMIN — TAMSULOSIN HYDROCHLORIDE 0.4 MILLIGRAM(S): 0.4 CAPSULE ORAL at 23:39

## 2019-04-01 NOTE — PROGRESS NOTE ADULT - SUBJECTIVE AND OBJECTIVE BOX
Urology Preop Note    Diagnosis: Right 1cm proximal - mid ureteral stone  Procedure: cystoscopy, right ureteroscopy, laser lithotripsy, stone extraction, exchange of right ureteral stent  Surgeon: Dr. Dawson Conn                          11.4   6.44  )-----------( 260      ( 31 Mar 2019 05:43 )             35.7       03-31    138  |  105  |  13  ----------------------------<  80  3.6   |  24  |  0.73    Ca    8.9      31 Mar 2019 05:43    UCx: Culture - Urine (03.27.19 @ 01:00)    -  Ertapenem: S <=0.5 SHAYY    -  Ciprofloxacin: S <=0.5 SHAYY    -  Cefepime: S <=2 SHAYY    -  Cefoxitin: S <=4 SHAYY    -  Ceftazidime: S <=1 SHAYY    -  Ceftriaxone: S <=1 SHAYY    Culture - Urine:   COLONY COUNT: > = 100,000 CFU/ML    -  Amikacin: S <=8 SHAYY    -  Ampicillin: S 4 SHAYY    -  Ampicillin/Sulbactam: S <=4/2 SHAYY    -  Aztreonam: S <=4 SHAYY    -  Cefazolin: S <=2 SHAYY    -  Gentamicin: S 2 SHAYY    -  Imipenem: S <=1 SHAYY    -  Levofloxacin: S <=1 SHAYY    -  Meropenem: S <=1 SHAYY    -  Nitrofurantoin: S <=32 SHAYY    -  Piperacillin/Tazobactam: S <=8 SHAYY    -  Tigecycline: S <=1 SHAYY    -  Tobramycin: S <=2 SHAYY    -  Trimethoprim/Sulfamethoxazole: R >2/38 SHAYY    Specimen Source: URINE MIDSTREAM    Organism Identification: Escherichia coli    Organism: Escherichia coli    Method Type: NEGATIVE SHAYY 43    CXR: < from: Xray Chest 1 View AP/PA (03.14.19 @ 04:55) >  IMPRESSION:  Opacification of approximately the lower third of the right   hemithorax may be due to a small to moderate right pleural effusion with   passive atelectasis.    Left retrocardiac opacity which could be due to a small left pleural   effusion with passive atelectasis.    Atelectasis of other cause and/or pneumonia in the areas of bilateral   opacification are not excluded.      < end of copied text >

## 2019-04-01 NOTE — PROGRESS NOTE ADULT - ASSESSMENT
38 y.o female s/p L stent placement on 3/13 for stone, readmitted for UTI - still w/some mild stent colic  - pt previously w/E. coli in urine - has been on culture appropriate abx  - OR tomorrow - scheduled for 4PM  - NPO after midnight, IVF@MN  - Consent obtained - in physical chart

## 2019-04-01 NOTE — PROGRESS NOTE ADULT - SUBJECTIVE AND OBJECTIVE BOX
38y old  Female who presents with a chief complaint of Chills, Renal Colic (01 Apr 2019 17:19)      Interval history:  Afebrile, continues to complain of nausea, colicky abdominal pain and headaches.     No Known Allergies      Antimicrobials:  cefTRIAXone   IVPB 1 Gram(s) IV Intermittent every 24 hours      REVIEW OF SYSTEMS:  No chest pain  No cough,   No rash.       Vital Signs Last 24 Hrs  T(C): 36.8 (04-01-19 @ 12:30), Max: 37 (04-01-19 @ 06:37)  T(F): 98.3 (04-01-19 @ 12:30), Max: 98.6 (04-01-19 @ 06:37)  HR: 68 (04-01-19 @ 14:09) (61 - 71)  BP: 100/58 (04-01-19 @ 14:09) (97/64 - 103/58)  RR: 17 (04-01-19 @ 14:09) (17 - 18)  SpO2: 100% (04-01-19 @ 14:09) (98% - 100%)      PHYSICAL EXAM:  Patient in no acute distress. AAOX3.  No icterus, no oral ulcers.  Cardiovascular: S1S2 normal.  Lungs: + air entry B/L lung fields.  Gastrointestinal: soft, + Rt sided tenderness, nondistended.  Extremities: no edema.   IV sites not inflamed.                             11.6   7.10  )-----------( 253      ( 01 Apr 2019 06:43 )             36.1   04-01    136  |  105  |  13  ----------------------------<  82  3.7   |  21<L>  |  0.75    Ca    9.0      01 Apr 2019 06:43      Culture - Blood (03.27.19 @ 01:03)    Culture - Blood:   NO ORGANISMS ISOLATED    Specimen Source: BLOOD PERIPHERAL

## 2019-04-01 NOTE — PROGRESS NOTE ADULT - SUBJECTIVE AND OBJECTIVE BOX
Patient is a 38y old  Female who presents with a chief complaint of Chills, Renal Colic (01 Apr 2019 06:51)      INTERVAL HPI/OVERNIGHT EVENTS:  T(C): 36.8 (04-01-19 @ 12:30), Max: 37 (04-01-19 @ 06:37)  HR: 68 (04-01-19 @ 14:09) (61 - 71)  BP: 100/58 (04-01-19 @ 14:09) (97/64 - 103/58)  RR: 17 (04-01-19 @ 14:09) (17 - 18)  SpO2: 100% (04-01-19 @ 14:09) (98% - 100%)  Wt(kg): --  I&O's Summary    31 Mar 2019 07:01  -  01 Apr 2019 07:00  --------------------------------------------------------  IN: 50 mL / OUT: 0 mL / NET: 50 mL    01 Apr 2019 07:01  -  01 Apr 2019 17:19  --------------------------------------------------------  IN: 1200 mL / OUT: 0 mL / NET: 1200 mL        LABS:                        11.6   7.10  )-----------( 253      ( 01 Apr 2019 06:43 )             36.1     04-01    136  |  105  |  13  ----------------------------<  82  3.7   |  21<L>  |  0.75    Ca    9.0      01 Apr 2019 06:43          CAPILLARY BLOOD GLUCOSE                MEDICATIONS  (STANDING):  cefTRIAXone   IVPB 1 Gram(s) IV Intermittent every 24 hours  influenza   Vaccine 0.5 milliLiter(s) IntraMuscular once  lactobacillus acidophilus 1 Tablet(s) Oral three times a day with meals  sodium chloride 0.9%. 1000 milliLiter(s) (100 mL/Hr) IV Continuous <Continuous>  tamsulosin 0.4 milliGRAM(s) Oral at bedtime    MEDICATIONS  (PRN):  morphine  - Injectable 2 milliGRAM(s) IV Push every 4 hours PRN Severe Pain (7 - 10)  ondansetron Injectable 4 milliGRAM(s) IV Push every 6 hours PRN Nausea and/or Vomiting          PHYSICAL EXAM:  GENERAL: NAD, well-groomed, well-developed  HEAD:  Atraumatic, Normocephalic  CHEST/LUNG: Clear to percussion bilaterally; No rales, rhonchi, wheezing, or rubs  HEART: Regular rate and rhythm; No murmurs, rubs, or gallops  ABDOMEN: Soft, Nontender, Nondistended; Bowel sounds present  EXTREMITIES:  2+ Peripheral Pulses, No clubbing, cyanosis, or edema  LYMPH: No lymphadenopathy noted  SKIN: No rashes or lesions    Care Discussed with Consultants/Other Providers [ ] YES  [ ] NO

## 2019-04-01 NOTE — PROGRESS NOTE ADULT - ASSESSMENT
38F hx of Nephrolithiasis presents to [facility] ED c/o 4 days of hematuria, chills, and renal colic (right sided).   No fever or leucocytosis  Appears non toxic.   Recurrent UTI in setting of stent and nephrolithiasis.   Given persistent discomfort even with the stent, likely would benefit from stone extraction and stent removal.   E coli very sensitive.       Plan:   Continue with Ceftriaxone 1 gm iv q24h while inpt.   Will keep abx for 48 hrs after the procedure.   If needed abx can be switched to po Cefpodoxime 200 mg q12h on discharge.   Follow up blood cx, ntd  Urology on board, plan for stone removal Tuesday.

## 2019-04-01 NOTE — PROGRESS NOTE ADULT - ATTENDING COMMENTS
Nicole Chatman  Pager: [phone]. If no response or past 5 pm call [phone].     Will sign off, please call with questions.

## 2019-04-01 NOTE — PROGRESS NOTE ADULT - ASSESSMENT
Problem/Plan - 1:  ·  Problem: Complicated UTI (urinary tract infection).  Plan: c/w ceftriaxone  urology fu  Pain control PRN.     Problem/Plan - 2:  ·  Problem: Kidney stone on right side.  Plan: Recommendations as per Urology - currently c/w Abx and Flomax.   stone extraction in am

## 2019-04-02 ENCOUNTER — APPOINTMENT (OUTPATIENT)
Dept: UROLOGY | Facility: AMBULATORY SURGERY CENTER | Age: 39
End: 2019-04-02

## 2019-04-02 LAB
ANION GAP SERPL CALC-SCNC: 12 MMO/L — SIGNIFICANT CHANGE UP (ref 7–14)
BUN SERPL-MCNC: 15 MG/DL — SIGNIFICANT CHANGE UP (ref 7–23)
CALCIUM SERPL-MCNC: 9.1 MG/DL — SIGNIFICANT CHANGE UP (ref 8.4–10.5)
CHLORIDE SERPL-SCNC: 102 MMOL/L — SIGNIFICANT CHANGE UP (ref 98–107)
CO2 SERPL-SCNC: 24 MMOL/L — SIGNIFICANT CHANGE UP (ref 22–31)
CREAT SERPL-MCNC: 0.77 MG/DL — SIGNIFICANT CHANGE UP (ref 0.5–1.3)
GLUCOSE SERPL-MCNC: 82 MG/DL — SIGNIFICANT CHANGE UP (ref 70–99)
HCG UR QL: NEGATIVE — SIGNIFICANT CHANGE UP
HCT VFR BLD CALC: 37.8 % — SIGNIFICANT CHANGE UP (ref 34.5–45)
HGB BLD-MCNC: 12.2 G/DL — SIGNIFICANT CHANGE UP (ref 11.5–15.5)
MAGNESIUM SERPL-MCNC: 1.8 MG/DL — SIGNIFICANT CHANGE UP (ref 1.6–2.6)
MCHC RBC-ENTMCNC: 28.5 PG — SIGNIFICANT CHANGE UP (ref 27–34)
MCHC RBC-ENTMCNC: 32.3 % — SIGNIFICANT CHANGE UP (ref 32–36)
MCV RBC AUTO: 88.3 FL — SIGNIFICANT CHANGE UP (ref 80–100)
NRBC # FLD: 0 K/UL — SIGNIFICANT CHANGE UP (ref 0–0)
PLATELET # BLD AUTO: 247 K/UL — SIGNIFICANT CHANGE UP (ref 150–400)
PMV BLD: 10.1 FL — SIGNIFICANT CHANGE UP (ref 7–13)
POTASSIUM SERPL-MCNC: 3.7 MMOL/L — SIGNIFICANT CHANGE UP (ref 3.5–5.3)
POTASSIUM SERPL-SCNC: 3.7 MMOL/L — SIGNIFICANT CHANGE UP (ref 3.5–5.3)
RBC # BLD: 4.28 M/UL — SIGNIFICANT CHANGE UP (ref 3.8–5.2)
RBC # FLD: 14.4 % — SIGNIFICANT CHANGE UP (ref 10.3–14.5)
SODIUM SERPL-SCNC: 138 MMOL/L — SIGNIFICANT CHANGE UP (ref 135–145)
WBC # BLD: 6.16 K/UL — SIGNIFICANT CHANGE UP (ref 3.8–10.5)
WBC # FLD AUTO: 6.16 K/UL — SIGNIFICANT CHANGE UP (ref 3.8–10.5)

## 2019-04-02 RX ORDER — HEPARIN SODIUM 5000 [USP'U]/ML
5000 INJECTION INTRAVENOUS; SUBCUTANEOUS EVERY 8 HOURS
Qty: 0 | Refills: 0 | Status: DISCONTINUED | OUTPATIENT
Start: 2019-04-02 | End: 2019-04-03

## 2019-04-02 RX ADMIN — ONDANSETRON 4 MILLIGRAM(S): 8 TABLET, FILM COATED ORAL at 15:21

## 2019-04-02 RX ADMIN — SODIUM CHLORIDE 100 MILLILITER(S): 9 INJECTION INTRAMUSCULAR; INTRAVENOUS; SUBCUTANEOUS at 21:37

## 2019-04-02 RX ADMIN — SODIUM CHLORIDE 100 MILLILITER(S): 9 INJECTION INTRAMUSCULAR; INTRAVENOUS; SUBCUTANEOUS at 15:21

## 2019-04-02 RX ADMIN — MORPHINE SULFATE 2 MILLIGRAM(S): 50 CAPSULE, EXTENDED RELEASE ORAL at 15:35

## 2019-04-02 RX ADMIN — HEPARIN SODIUM 5000 UNIT(S): 5000 INJECTION INTRAVENOUS; SUBCUTANEOUS at 21:41

## 2019-04-02 RX ADMIN — ONDANSETRON 4 MILLIGRAM(S): 8 TABLET, FILM COATED ORAL at 21:37

## 2019-04-02 RX ADMIN — CEFTRIAXONE 100 GRAM(S): 500 INJECTION, POWDER, FOR SOLUTION INTRAMUSCULAR; INTRAVENOUS at 21:41

## 2019-04-02 RX ADMIN — MORPHINE SULFATE 2 MILLIGRAM(S): 50 CAPSULE, EXTENDED RELEASE ORAL at 15:21

## 2019-04-02 NOTE — PROGRESS NOTE ADULT - SUBJECTIVE AND OBJECTIVE BOX
Subjective  No overnight events. Pt comfortable.    Objective    Vital signs  T(F): , Max: 98.6 (04-01-19 @ 06:37)  HR: 67 (04-01-19 @ 21:42)  BP: 104/54 (04-01-19 @ 21:42)  SpO2: 99% (04-01-19 @ 21:42)  Wt(kg): --    Output     03-31 @ 07:01  -  04-01 @ 07:00  --------------------------------------------------------  IN: 50 mL / OUT: 0 mL / NET: 50 mL    04-01 @ 07:01  -  04-02 @ 06:28  --------------------------------------------------------  IN: 1200 mL / OUT: 0 mL / NET: 1200 mL        Gen: NAD  Abd: soft, NT, ND    Labs      04-01 @ 06:43    WBC 7.10  / Hct 36.1  / SCr 0.75       Imaging

## 2019-04-02 NOTE — PROGRESS NOTE ADULT - ASSESSMENT
38 y.o female s/p L stent placement on 3/13 for stone, readmitted for UTI - still w/some mild stent colic  - pt previously w/E. coli in urine - has been on culture appropriate abx  - c/w CTX  - appreciate ID recs - likely home w/3d cefpodoxime 200mg q12  - OR today - scheduled for 4PM  - c/w NPO/IVF  - Consent obtained - in physical chart

## 2019-04-02 NOTE — CHART NOTE - NSCHARTNOTEFT_GEN_A_CORE
s/p R URS, Stent replacement with string taped to mons pubis    Patient is cleared from urologic standpoint for discharge home  Very important -- Patient to follow up this Friday 4/5/17 at [facility] SPECIFICALLY THE UR[facility]Y [facility]IDENT [facility] for Stent removal prior to flight back to [place]  Patient will need to call to confirm her appointment at [phone]

## 2019-04-02 NOTE — ASU PREOP CHECKLIST - 1.
Vital Signs done in ASU T-  36.8     ,P-64          ,O2-     100     ,BP-  108     /   60      and R-   16    .

## 2019-04-02 NOTE — PROGRESS NOTE ADULT - ASSESSMENT
38 year old female s/p Lithotripsy, with ureteral stent insertion, stable.     -Strict I&O's  -Analgesia prn  -Antiemetics prn  -DVT prophylaxis  -Incentive spirometry  -OOB  -Regular diet  -Patient is cleared from urologic standpoint for discharge home  -Patient to follow up this Friday 4/5/17 at [facility] SPECIFICALLY THE UROLOGY RESIDENT [facility] for Stent removal prior to flight back to [place]  -Patient will need to call to confirm her appointment at [phone]. 38 year old female s/p Lithotripsy, with ureteral stent insertion, stable.     -Strict I&O's  -Analgesia prn  -Antiemetics prn  -DVT prophylaxis  -Incentive spirometry  -OOB  -Regular diet  -Patient is cleared from urologic standpoint for discharge home  -Patient to follow up this Friday 4/5/19 at [facility] SPECIFICALLY THE UROLOGY RESIDENT [facility] for Stent removal prior to flight back to [place]  -Patient will need to call to confirm her appointment at [phone].

## 2019-04-02 NOTE — ASU PREOP CHECKLIST - 2.
Turkmen speaking  Patient gave permission to (   Stacy                )  to  be  transistor     ID : ID : [id number]

## 2019-04-02 NOTE — PROGRESS NOTE ADULT - SUBJECTIVE AND OBJECTIVE BOX
Note    Post op Check    s/p Lithotripsy, with ureteral stent insertion  EBL oml    Pt seen and examined reporting some nausea and dizziness, reports pain is controlled.     Vital Signs Last 24 Hrs  T(C): 36.3 (02 Apr 2019 21:18), Max: 37.1 (02 Apr 2019 12:39)  T(F): 97.3 (02 Apr 2019 21:18), Max: 98.7 (02 Apr 2019 12:39)  HR: 70 (02 Apr 2019 21:18) (64 - 105)  BP: 111/67 (02 Apr 2019 21:18) (92/54 - 130/82)  BP(mean): 83 (02 Apr 2019 20:15) (82 - 93)  RR: 18 (02 Apr 2019 21:18) (12 - 26)  SpO2: 100% (02 Apr 2019 21:18) (97% - 100%)    I&O's Summary    01 Apr 2019 07:01  -  02 Apr 2019 07:00  --------------------------------------------------------  IN: 1200 mL / OUT: 0 mL / NET: 1200 mL    02 Apr 2019 07:01  -  02 Apr 2019 22:22  --------------------------------------------------------  IN: 100 mL / OUT: 0 mL / NET: 100 mL    PHYSICAL EXAM:   Constitutional: well appearing, A+Ox3, no acute distress    Respiratory: clear    Cardiovascular: regular    Gastrointestinal: soft, nontender, minimal distention    Genitourinary: no tenderness, string of stent in place.     Extremities: no edema, no tenderness

## 2019-04-02 NOTE — BRIEF OPERATIVE NOTE - NSICDXBRIEFOPLAUNCH_GEN_ALL_CORE
Diagnostic testing not indicated for today's encounter
<--- Click to Launch ICDx for PreOp, PostOp and Procedure

## 2019-04-03 ENCOUNTER — TRANSCRIPTION ENCOUNTER (OUTPATIENT)
Age: 39
End: 2019-04-03

## 2019-04-03 VITALS
RESPIRATION RATE: 19 BRPM | DIASTOLIC BLOOD PRESSURE: 75 MMHG | SYSTOLIC BLOOD PRESSURE: 111 MMHG | TEMPERATURE: 98 F | HEART RATE: 73 BPM | OXYGEN SATURATION: 97 %

## 2019-04-03 PROBLEM — N20.0 CALCULUS OF KIDNEY: Chronic | Status: ACTIVE | Noted: 2019-03-27

## 2019-04-03 LAB
ANION GAP SERPL CALC-SCNC: 12 MMO/L — SIGNIFICANT CHANGE UP (ref 7–14)
BUN SERPL-MCNC: 11 MG/DL — SIGNIFICANT CHANGE UP (ref 7–23)
CALCIUM SERPL-MCNC: 9.1 MG/DL — SIGNIFICANT CHANGE UP (ref 8.4–10.5)
CHLORIDE SERPL-SCNC: 103 MMOL/L — SIGNIFICANT CHANGE UP (ref 98–107)
CO2 SERPL-SCNC: 21 MMOL/L — LOW (ref 22–31)
CREAT SERPL-MCNC: 0.67 MG/DL — SIGNIFICANT CHANGE UP (ref 0.5–1.3)
GLUCOSE SERPL-MCNC: 107 MG/DL — HIGH (ref 70–99)
HCT VFR BLD CALC: 34.9 % — SIGNIFICANT CHANGE UP (ref 34.5–45)
HGB BLD-MCNC: 11.4 G/DL — LOW (ref 11.5–15.5)
MAGNESIUM SERPL-MCNC: 1.8 MG/DL — SIGNIFICANT CHANGE UP (ref 1.6–2.6)
MCHC RBC-ENTMCNC: 28.6 PG — SIGNIFICANT CHANGE UP (ref 27–34)
MCHC RBC-ENTMCNC: 32.7 % — SIGNIFICANT CHANGE UP (ref 32–36)
MCV RBC AUTO: 87.5 FL — SIGNIFICANT CHANGE UP (ref 80–100)
NRBC # FLD: 0 K/UL — SIGNIFICANT CHANGE UP (ref 0–0)
PLATELET # BLD AUTO: 232 K/UL — SIGNIFICANT CHANGE UP (ref 150–400)
PMV BLD: 10.6 FL — SIGNIFICANT CHANGE UP (ref 7–13)
POTASSIUM SERPL-MCNC: 3.9 MMOL/L — SIGNIFICANT CHANGE UP (ref 3.5–5.3)
POTASSIUM SERPL-SCNC: 3.9 MMOL/L — SIGNIFICANT CHANGE UP (ref 3.5–5.3)
RBC # BLD: 3.99 M/UL — SIGNIFICANT CHANGE UP (ref 3.8–5.2)
RBC # FLD: 14.1 % — SIGNIFICANT CHANGE UP (ref 10.3–14.5)
SODIUM SERPL-SCNC: 136 MMOL/L — SIGNIFICANT CHANGE UP (ref 135–145)
WBC # BLD: 6.93 K/UL — SIGNIFICANT CHANGE UP (ref 3.8–10.5)
WBC # FLD AUTO: 6.93 K/UL — SIGNIFICANT CHANGE UP (ref 3.8–10.5)

## 2019-04-03 RX ORDER — OXYBUTYNIN CHLORIDE 5 MG
5 TABLET ORAL DAILY
Qty: 0 | Refills: 0 | Status: DISCONTINUED | OUTPATIENT
Start: 2019-04-03 | End: 2019-04-03

## 2019-04-03 RX ORDER — CEFPODOXIME PROXETIL 100 MG
1 TABLET ORAL
Qty: 4 | Refills: 0 | OUTPATIENT
Start: 2019-04-03 | End: 2019-04-04

## 2019-04-03 RX ORDER — ACETAMINOPHEN 500 MG
1000 TABLET ORAL ONCE
Qty: 0 | Refills: 0 | Status: COMPLETED | OUTPATIENT
Start: 2019-04-03 | End: 2019-04-03

## 2019-04-03 RX ORDER — LACTOBACILLUS ACIDOPHILUS 100MM CELL
1 CAPSULE ORAL
Qty: 6 | Refills: 0 | OUTPATIENT
Start: 2019-04-03 | End: 2019-04-04

## 2019-04-03 RX ORDER — OXYBUTYNIN CHLORIDE 5 MG
5 TABLET ORAL THREE TIMES A DAY
Qty: 0 | Refills: 0 | Status: DISCONTINUED | OUTPATIENT
Start: 2019-04-03 | End: 2019-04-03

## 2019-04-03 RX ADMIN — Medication 1 TABLET(S): at 08:30

## 2019-04-03 RX ADMIN — HEPARIN SODIUM 5000 UNIT(S): 5000 INJECTION INTRAVENOUS; SUBCUTANEOUS at 05:10

## 2019-04-03 RX ADMIN — Medication 400 MILLIGRAM(S): at 04:01

## 2019-04-03 RX ADMIN — Medication 1 TABLET(S): at 13:35

## 2019-04-03 RX ADMIN — HEPARIN SODIUM 5000 UNIT(S): 5000 INJECTION INTRAVENOUS; SUBCUTANEOUS at 13:35

## 2019-04-03 RX ADMIN — Medication 1000 MILLIGRAM(S): at 04:31

## 2019-04-03 RX ADMIN — SODIUM CHLORIDE 100 MILLILITER(S): 9 INJECTION INTRAMUSCULAR; INTRAVENOUS; SUBCUTANEOUS at 05:10

## 2019-04-03 NOTE — DISCHARGE NOTE PROVIDER - NSDCCPCAREPLAN_GEN_ALL_CORE_FT
PRINCIPAL DISCHARGE DIAGNOSIS  Diagnosis: Kidney stone on right side  Assessment and Plan of Treatment: You underwent lithotripsy and Right ureteral stent placement on 4/2/19. Please follow up with [facility], specifically the Urology Resident Clinic, for stent removal on Friday 4/5/19. Call [phone] to confirm your appointment.      SECONDARY DISCHARGE DIAGNOSES  Diagnosis: Urinary tract infection with hematuria, site unspecified  Assessment and Plan of Treatment: Continue Cefpodoxime 200mg twice a day through 4/4/19. Follow up with your primary care physician within 1-2 weeks. Call for appointment.

## 2019-04-03 NOTE — DISCHARGE NOTE PROVIDER - HOSPITAL COURSE
37 y/o Female, with a PMHx of Nephrolithiasis, presented to [facility] ED c/o 4 days of hematuria, chills, and renal colic (right sided). [place] [facility]. Two weeks ago (3/11-3/14) had kidney stone requiring inpatient implantation of R ureteral stent. Was discharged on Cefdinir which she completed recently. However she then developed blood in urine with chills and right sided colic pain with mild migration to the left flank. Concerned for infection she came back to the ED. No recorded fevers at home. Urine culture growing E.coli. Patient was seen by Urology and ID and started on Zosyn then switched to Ceftriaxone 1g IV daily for complicated UTI. Renal US revealed no hydronephrosis of either kidney, R ureteral stent in place. Blood cultures negative. Patient underwent lithotripsy and ureteral stent placement on 4/2/19 by Urology and should follow up on Friday 4/5/19 for stent removal. Patient was started on Flomax and discharged on oral Cefpodoxime.    Case discussed with Dr. Benson and Urology team and patient is medically stable for discharge home. 39 y/o Female, with a PMHx of Nephrolithiasis, presented to [facility] ED c/o 4 days of hematuria, chills, and renal colic (right sided). [place] [facility]. Two weeks ago (3/11-3/14) had kidney stone requiring inpatient implantation of R ureteral stent. Was discharged on Cefdinir which she completed recently. However she then developed blood in urine with chills and right sided colic pain with mild migration to the left flank. Concerned for infection she came back to the ED. No recorded fevers at home. Urine culture growing E.coli. Patient was seen by Urology and ID and started on Zosyn then switched to Ceftriaxone 1g IV daily for complicated UTI. Renal US revealed no hydronephrosis of either kidney, R ureteral stent in place. Blood cultures negative. Patient underwent lithotripsy and ureteral stent placement on 4/2/19 by Urology and should follow up on Friday 4/5/19 for stent removal. Patient to continue Flomax and Ditropan and will be discharged on oral Cefpodoxime for 48hrs post procedure.    Case discussed with Dr. Benson and Urology team and patient is medically stable for discharge home.

## 2019-04-03 NOTE — PROGRESS NOTE ADULT - SUBJECTIVE AND OBJECTIVE BOX
ANESTHESIA POSTOP CHECK    38y Female POSTOP DAY 1 S/P cysto/stent    Vital Signs Last 24 Hrs  T(C): 36.6 (03 Apr 2019 03:30), Max: 37.1 (02 Apr 2019 12:39)  T(F): 97.9 (03 Apr 2019 03:30), Max: 98.7 (02 Apr 2019 12:39)  HR: 67 (03 Apr 2019 03:30) (64 - 105)  BP: 107/59 (03 Apr 2019 03:30) (95/63 - 130/82)  BP(mean): 83 (02 Apr 2019 20:15) (82 - 93)  RR: 17 (03 Apr 2019 03:30) (12 - 26)  SpO2: 99% (03 Apr 2019 03:30) (97% - 100%)  I&O's Summary    02 Apr 2019 07:01  -  03 Apr 2019 07:00  --------------------------------------------------------  IN: 100 mL / OUT: 0 mL / NET: 100 mL        [x ] NO APPARENT ANESTHESIA COMPLICATIONS      Comments:

## 2019-04-03 NOTE — DISCHARGE NOTE PROVIDER - CARE PROVIDER_API CALL
[facility],   Urology Resident Clinic  [address] [place] [address]  Phone: [phone]  Fax: (   )    -  Follow Up Time:

## 2019-04-03 NOTE — DISCHARGE NOTE PROVIDER - PROVIDER TOKENS
FREE:[LAST:[[facility]],PHONE:[phone]],FAX:[(   )    -],ADDRESS:[Urology Resident Clinic  [address] [place] [address], [facility]]]

## 2019-04-03 NOTE — PROGRESS NOTE ADULT - REASON FOR ADMISSION
Chills, Renal Colic

## 2019-04-03 NOTE — DISCHARGE NOTE NURSING/CASE MANAGEMENT/SOCIAL WORK - NSDCDPATPORTLINK_GEN_ALL_CORE
You can access the [facility] Portal, offered by [facility], by registering with the following website: [email or web address]

## 2019-04-05 ENCOUNTER — APPOINTMENT (OUTPATIENT)
Dept: UROLOGY | Facility: CLINIC | Age: 39
End: 2019-04-05

## 2019-04-05 NOTE — HISTORY OF PRESENT ILLNESS
[Urinary Frequency] : urinary frequency [Intermittency] : intermittency [Dysuria] : dysuria [Bladder Spasm] : bladder spasm [FreeTextEntry1] : 38 year old female with no significant PMHx presents for follow up after R URS, stone extraction, stent placement and is here for stent on a string removal.  Patient initially had a septic stone that required emergency stenting, and returned with pyelonephritis.  Surgery was uneventful, since then has been having some stent colic. [Urinary Retention] : no urinary retention [Urinary Urgency] : no urinary urgency [Straining] : no straining [Weak Stream] : no weak stream [Hematuria - Gross] : no gross hematuria [Fever] : no fever [Fatigue] : no fatigue [Nausea] : no nausea

## 2019-04-05 NOTE — PHYSICAL EXAM
[General Appearance - Well Developed] : well developed [General Appearance - Well Nourished] : well nourished [Normal Appearance] : normal appearance [Well Groomed] : well groomed [General Appearance - In No Acute Distress] : no acute distress [Bowel Sounds] : normal bowel sounds [Abdomen Soft] : soft [Abdomen Tenderness] : non-tender [Abdomen Mass (___ Cm)] : no abdominal mass palpated [Costovertebral Angle Tenderness] : no ~M costovertebral angle tenderness [Urethral Meatus] : the meatus of the urethra showed no abnormalities [Urinary Bladder Findings] : the bladder was normal on palpation [Skin Color & Pigmentation] : normal skin color and pigmentation [Edema] : no peripheral edema [] : no respiratory distress [Exaggerated Use Of Accessory Muscles For Inspiration] : no accessory muscle use [Oriented To Time, Place, And Person] : oriented to person, place, and time [Normal Station and Gait] : the gait and station were normal for the patient's age [FreeTextEntry1] : Stent on a string removed with no issues

## 2019-04-05 NOTE — ASSESSMENT
[FreeTextEntry1] : 38 year old female presents for follow up after emergency stenting for septic right ureteral stone, now s/p definitive URS and stone extraction.  Stent on a string removed with no issues.  \par \par Finished last dose of antibiotics today.  Flying back to [place] in a few days.  \par \par Instructed to increase hydration, decrease salt intake, more fruits and vegetables, and less red meat.  WIll need to follow up with urologist in [place].

## 2019-04-08 LAB — STONE ANALYSIS-IMP: SIGNIFICANT CHANGE UP

## 2019-04-09 LAB — FUNGUS SPEC QL CULT: SIGNIFICANT CHANGE UP

## 2019-04-23 LAB — ACID FAST STN SPEC: SIGNIFICANT CHANGE UP

## 2019-09-10 NOTE — CHART NOTE - NSCHARTNOTEFT_GEN_A_CORE
6 Month Follow Up  SBRT x 5 ending 06/23/2017 03/01/2019 - CT Chest without Contrast    Patient is using a cane as well as holding on to daughter's arm.         James Serna, CMA Tele PA Note    Pt still c/o nausea and vomiting. Spoke to CARLIN Simmons from Urologist to explain her symptoms. Not an emergency because there is no bump in creatinine and pt is afebrile. Pt is scheduled for a stone removal on Tuesday. Pt is visiting from [facility] and if leaves the hospital, she will loose her emergency medicaid. Urology aware. Pt will need to stay in hospital until procedure on Tuesday.    Devin Dahl PA-C  Tele PA   [phone]

## 2020-02-23 NOTE — ED ADULT NURSE NOTE - NS PRO PASSIVE SMOKE EXP
right eye irritation    pt slept in her contacts on Thursday night... took them out on Friday night.... right eye burning, tearing, blurry vision
Unknown

## 2021-08-23 NOTE — PROGRESS NOTE ADULT - SUBJECTIVE AND OBJECTIVE BOX
Pediatric questionnaire sent to your email and ready for review. Patient is a 38y old  Female who presents with a chief complaint of Chills, Renal Colic (29 Mar 2019 10:38)      INTERVAL HPI/OVERNIGHT EVENTS:  T(C): 36.6 (03-29-19 @ 12:31), Max: 37 (03-29-19 @ 10:57)  HR: 68 (03-29-19 @ 16:05) (64 - 82)  BP: 99/59 (03-29-19 @ 16:05) (96/65 - 104/68)  RR: 18 (03-29-19 @ 12:31) (17 - 20)  SpO2: 100% (03-29-19 @ 12:31) (98% - 100%)  Wt(kg): --  I&O's Summary      LABS:                        11.8   7.82  )-----------( 335      ( 29 Mar 2019 06:30 )             37.6     03-29    137  |  105  |  15  ----------------------------<  84  3.8   |  22  |  0.89    Ca    9.0      29 Mar 2019 05:30  Phos  3.6     03-28  Mg     1.9     03-28    TPro  6.8  /  Alb  3.7  /  TBili  0.6  /  DBili  x   /  AST  11  /  ALT  13  /  AlkPhos  49  03-28        CAPILLARY BLOOD GLUCOSE                MEDICATIONS  (STANDING):  influenza   Vaccine 0.5 milliLiter(s) IntraMuscular once  piperacillin/tazobactam IVPB. 3.375 Gram(s) IV Intermittent every 8 hours  sodium chloride 0.9%. 1000 milliLiter(s) (100 mL/Hr) IV Continuous <Continuous>  tamsulosin 0.4 milliGRAM(s) Oral at bedtime    MEDICATIONS  (PRN):  morphine  - Injectable 2 milliGRAM(s) IV Push every 4 hours PRN Severe Pain (7 - 10)  ondansetron Injectable 4 milliGRAM(s) IV Push every 6 hours PRN Nausea and/or Vomiting          PHYSICAL EXAM:  GENERAL: NAD, well-groomed, well-developed  HEAD:  Atraumatic, Normocephalic  CHEST/LUNG: Clear to percussion bilaterally; No rales, rhonchi, wheezing, or rubs  HEART: Regular rate and rhythm; No murmurs, rubs, or gallops  ABDOMEN: Soft, Nontender, Nondistended; Bowel sounds present  EXTREMITIES:  2+ Peripheral Pulses, No clubbing, cyanosis, or edema  LYMPH: No lymphadenopathy noted  SKIN: No rashes or lesions    Care Discussed with Consultants/Other Providers [ ] YES  [ ] NO

## 2022-06-15 NOTE — DISCHARGE NOTE NURSING/CASE MANAGEMENT/SOCIAL WORK - COMPLETE THE FOLLOWING IF THE PATIENT REFUSES THE INFLUENZA VACCINE:
What Type Of Note Output Would You Prefer (Optional)?: Standard Output Hpi Title: Evaluation of Skin Lesions How Severe Are Your Spot(S)?: mild Have Your Spot(S) Been Treated In The Past?: has not been treated Family Member: Mother Vaccine Information Sheet (VIS) provided-VIS date: 8/07/15

## 2023-08-28 NOTE — H&P ADULT - FAMILY HISTORY
No pertinent family history in first degree relatives Posterior Auricular Interpolation Flap Text: A decision was made to reconstruct the defect utilizing an interpolation axial flap and a staged reconstruction.  A telfa template was made of the defect.  This telfa template was then used to outline the posterior auricular interpolation flap.  The donor area for the pedicle flap was then injected with anesthesia.  The flap was excised through the skin and subcutaneous tissue down to the layer of the underlying musculature.  The pedicle flap was carefully excised within this deep plane to maintain its blood supply.  The edges of the donor site were undermined.   The donor site was closed in a primary fashion.  The pedicle was then rotated into position and sutured.  Once the tube was sutured into place, adequate blood supply was confirmed with blanching and refill.  The pedicle was then wrapped with xeroform gauze and dressed appropriately with a telfa and gauze bandage to ensure continued blood supply and protect the attached pedicle.

## 2023-10-27 NOTE — H&P ADULT - ASSESSMENT
From: Tomy Heimlich  To: Dr. Priya Rivera  Sent: 10/26/2023 1:48 PM EDT  Subject: Albuterol     Can you please send a prescription for another inhaler    Thanks
Medication:   Requested Prescriptions     Pending Prescriptions Disp Refills    albuterol sulfate HFA (PROAIR HFA) 108 (90 Base) MCG/ACT inhaler 18 g 3     Sig: Inhale 2 puffs into the lungs every 6 hours as needed for Wheezing        Last Filled:  5/12/23    Patient Phone Number: [phone] (home)     Last appt: 5/24/2023   Next appt: Visit date not found    Last OARRS:        No data to display
This is a healthy 38 year old female presenting with new onset L renal colic secondary to a 1cm obstructing right proximal ureteral calculus.    - NPO, IVF  - OR, POC for right ureteral stent placement  - dispo pending recovery from stent placement, will require interval ureteroscopy  - admit to Dr. Darrick Patel.  D/w Dr. Patel.
